# Patient Record
Sex: FEMALE | Race: BLACK OR AFRICAN AMERICAN | Employment: OTHER | URBAN - METROPOLITAN AREA
[De-identification: names, ages, dates, MRNs, and addresses within clinical notes are randomized per-mention and may not be internally consistent; named-entity substitution may affect disease eponyms.]

---

## 2019-05-04 ENCOUNTER — HOSPITAL ENCOUNTER (OUTPATIENT)
Dept: MRI IMAGING | Age: 74
Discharge: HOME | End: 2019-05-04
Attending: NURSE PRACTITIONER
Payer: MEDICARE

## 2019-05-04 VITALS — WEIGHT: 181 LBS | BODY MASS INDEX: 30.12 KG/M2

## 2019-05-04 DIAGNOSIS — K85.90 ACUTE PANCREATITIS, UNSPECIFIED COMPLICATION STATUS, UNSPECIFIED PANCREATITIS TYPE: ICD-10-CM

## 2019-05-04 PROCEDURE — A9585 GADOBUTROL INJECTION: HCPCS | Performed by: INTERNAL MEDICINE

## 2019-05-04 PROCEDURE — 77030021566

## 2019-05-04 PROCEDURE — 74011250636 HC RX REV CODE- 250/636: Performed by: INTERNAL MEDICINE

## 2019-05-04 PROCEDURE — 74183 MRI ABD W/O CNTR FLWD CNTR: CPT

## 2019-05-04 RX ADMIN — GADOBUTROL 8 ML: 604.72 INJECTION INTRAVENOUS at 09:17

## 2019-06-04 RX ORDER — OMEPRAZOLE 20 MG/1
20 TABLET, DELAYED RELEASE ORAL DAILY
Status: ON HOLD | COMMUNITY
End: 2019-06-11

## 2019-06-04 NOTE — PERIOP NOTES
[address] [place] [address] Dr Nicholas Preprocedure Instructions      1. On the day of your surgery, please report to registration located on the 2nd floor of the  [facility]. yes    2. You must have a responsible adult to drive you to the hospital, stay at the hospital during your procedure and drive you home. If they leave your procedure will not be started (no exceptions). yes    3. Do not have anything to eat or drink (including water, gum, mints, coffee, and juice) after midnight. This does not apply to the medications you were instructed to take by your physician. yesIf you are currently taking Plavix, Coumadin, Aspirin, or other blood-thinning agents, contact your physician for special instructions. yes,    4. If you are having a procedure that requires bowel prep: We recommend that you have only clear liquids the day before your procedure and begin your bowel prep by 5:00 pm.  You may continue to drink clear liquids until midnight. If for any reason you are not able to complete your prep please notify your physician immediately. yes    5. Have a list of all current medications, including vitamins, herbal supplements and any other over the counter medications. yes    6. If you wear glasses, contacts, dentures and/or hearing aids, they may be removed prior to procedure, please bring a case to store them in. yes    7. You should understand that if you do not follow these instructions your procedure may be cancelled. If your physical condition changes (I.e. fever, cold or flu) please contact your doctor as soon as possible. 8. It is important that you be on time.   If for any reason you are unable to keep your appointment please call )- the day of or your physicians office prior to your scheduled procedure

## 2019-06-11 ENCOUNTER — HOSPITAL ENCOUNTER (OUTPATIENT)
Age: 74
Setting detail: OUTPATIENT SURGERY
Discharge: HOME | End: 2019-06-11
Attending: SPECIALIST | Admitting: SPECIALIST
Payer: MEDICARE

## 2019-06-11 ENCOUNTER — ANESTHESIA EVENT (OUTPATIENT)
Dept: ENDOSCOPY | Age: 74
End: 2019-06-11
Payer: MEDICARE

## 2019-06-11 ENCOUNTER — ANESTHESIA (OUTPATIENT)
Dept: ENDOSCOPY | Age: 74
End: 2019-06-11
Payer: MEDICARE

## 2019-06-11 VITALS
TEMPERATURE: 97.5 F | OXYGEN SATURATION: 99 % | WEIGHT: 188.49 LBS | RESPIRATION RATE: 17 BRPM | DIASTOLIC BLOOD PRESSURE: 83 MMHG | SYSTOLIC BLOOD PRESSURE: 166 MMHG | HEIGHT: 65 IN | BODY MASS INDEX: 31.4 KG/M2 | HEART RATE: 65 BPM

## 2019-06-11 LAB
GLUCOSE BLD STRIP.AUTO-MCNC: 112 MG/DL (ref 65–100)
SERVICE CMNT-IMP: ABNORMAL

## 2019-06-11 PROCEDURE — 82962 GLUCOSE BLOOD TEST: CPT

## 2019-06-11 PROCEDURE — 76060000031 HC ANESTHESIA FIRST 0.5 HR: Performed by: SPECIALIST

## 2019-06-11 PROCEDURE — 74011250637 HC RX REV CODE- 250/637: Performed by: ANESTHESIOLOGY

## 2019-06-11 PROCEDURE — 74011250636 HC RX REV CODE- 250/636

## 2019-06-11 PROCEDURE — 76040000019: Performed by: SPECIALIST

## 2019-06-11 RX ORDER — SPIRONOLACTONE 25 MG/1
25 TABLET ORAL 2 TIMES DAILY
COMMUNITY

## 2019-06-11 RX ORDER — SODIUM CHLORIDE 9 MG/ML
50 INJECTION, SOLUTION INTRAVENOUS CONTINUOUS
Status: DISCONTINUED | OUTPATIENT
Start: 2019-06-11 | End: 2019-06-11 | Stop reason: HOSPADM

## 2019-06-11 RX ORDER — EPINEPHRINE 0.1 MG/ML
1 INJECTION INTRACARDIAC; INTRAVENOUS
Status: DISCONTINUED | OUTPATIENT
Start: 2019-06-11 | End: 2019-06-11 | Stop reason: HOSPADM

## 2019-06-11 RX ORDER — CARVEDILOL 12.5 MG/1
25 TABLET ORAL ONCE
Status: COMPLETED | OUTPATIENT
Start: 2019-06-11 | End: 2019-06-11

## 2019-06-11 RX ORDER — SODIUM CHLORIDE 9 MG/ML
INJECTION, SOLUTION INTRAVENOUS
Status: DISCONTINUED | OUTPATIENT
Start: 2019-06-11 | End: 2019-06-11 | Stop reason: HOSPADM

## 2019-06-11 RX ORDER — PROPOFOL 10 MG/ML
INJECTION, EMULSION INTRAVENOUS
Status: DISCONTINUED | OUTPATIENT
Start: 2019-06-11 | End: 2019-06-11 | Stop reason: HOSPADM

## 2019-06-11 RX ORDER — FLUMAZENIL 0.1 MG/ML
0.2 INJECTION INTRAVENOUS
Status: DISCONTINUED | OUTPATIENT
Start: 2019-06-11 | End: 2019-06-11 | Stop reason: HOSPADM

## 2019-06-11 RX ORDER — PROPOFOL 10 MG/ML
INJECTION, EMULSION INTRAVENOUS AS NEEDED
Status: DISCONTINUED | OUTPATIENT
Start: 2019-06-11 | End: 2019-06-11 | Stop reason: HOSPADM

## 2019-06-11 RX ORDER — NALOXONE HYDROCHLORIDE 0.4 MG/ML
0.4 INJECTION, SOLUTION INTRAMUSCULAR; INTRAVENOUS; SUBCUTANEOUS
Status: DISCONTINUED | OUTPATIENT
Start: 2019-06-11 | End: 2019-06-11 | Stop reason: HOSPADM

## 2019-06-11 RX ORDER — FENTANYL CITRATE 50 UG/ML
25 INJECTION, SOLUTION INTRAMUSCULAR; INTRAVENOUS AS NEEDED
Status: DISCONTINUED | OUTPATIENT
Start: 2019-06-11 | End: 2019-06-11 | Stop reason: HOSPADM

## 2019-06-11 RX ORDER — DEXTROMETHORPHAN/PSEUDOEPHED 2.5-7.5/.8
1.2 DROPS ORAL
Status: DISCONTINUED | OUTPATIENT
Start: 2019-06-11 | End: 2019-06-11 | Stop reason: HOSPADM

## 2019-06-11 RX ORDER — ATROPINE SULFATE 0.1 MG/ML
0.5 INJECTION INTRAVENOUS
Status: DISCONTINUED | OUTPATIENT
Start: 2019-06-11 | End: 2019-06-11 | Stop reason: HOSPADM

## 2019-06-11 RX ORDER — MIDAZOLAM HYDROCHLORIDE 1 MG/ML
.25-5 INJECTION, SOLUTION INTRAMUSCULAR; INTRAVENOUS AS NEEDED
Status: DISCONTINUED | OUTPATIENT
Start: 2019-06-11 | End: 2019-06-11 | Stop reason: HOSPADM

## 2019-06-11 RX ADMIN — CARVEDILOL 25 MG: 12.5 TABLET, FILM COATED ORAL at 07:09

## 2019-06-11 RX ADMIN — PROPOFOL 50 MG: 10 INJECTION, EMULSION INTRAVENOUS at 07:28

## 2019-06-11 RX ADMIN — PROPOFOL 125 MCG/KG/MIN: 10 INJECTION, EMULSION INTRAVENOUS at 07:28

## 2019-06-11 RX ADMIN — SODIUM CHLORIDE: 9 INJECTION, SOLUTION INTRAVENOUS at 07:22

## 2019-06-11 NOTE — PROGRESS NOTES
Chula Rubin  1945  [id number]    Situation:  Verbal report received from: Kimo Martinez, RN   Procedure: Procedure(s):  COLONOSCOPY    Background:    Preoperative diagnosis: AMILY HX OF MALIGNANT NEOPLASM  Postoperative diagnosis: diverticulosis   hemorrhoids    :  Dr. Dusty Hopkins   Assistant(s): Endoscopy Technician-1: Mel Evans  Endoscopy RN-1: Kaylynn Wong RN    Specimens: * No specimens in log *  H. Pylori  no    Assessment:  Intra-procedure medications      Anesthesia gave intra-procedure sedation and medications, see anesthesia flow sheet yes    Intravenous fluids: NS@ KVO     Vital signs stable   yes    Abdominal assessment: round and soft   yes    Recommendation:  Discharge patient per MD order  yes.   Return to floor  outpatient  Family or Friend   Daughter   Permission to share finding with family or friend yes

## 2019-06-11 NOTE — DISCHARGE INSTRUCTIONS
[address] [facility] MULU [place] MD  [phone]      June 11, 2019    Abhishek Todd  YOB: 1945    COLONOSCOPY DISCHARGE INSTRUCTIONS    If there is redness at IV site you should apply warm compress to area. If redness or soreness persist contact Dr. Alla Ellsworth' or your primary care doctor. There may be a slight amount of blood passed from the rectum. Gaseous discomfort may develop, but walking, belching will help relieve this. You may not operate a vehicle for 12 hours  You may not operate machinery or dangerous appliances for rest of today  You may not drink alcoholic beverages for 12 hours  Avoid making any critical decisions for 24 hours    DIET:  You may resume your normal diet, but some patients find that heavy or large meals may lead to indigestion or vomiting. I suggest a light meal as first food intake. MEDICATIONS:  The use of some over-the-counter pain medication may lead to bleeding after colon biopsies or polyp removal.  Tylenol (also called acetaminophen) is safe to take even if you have had colonoscopy with polyp removal.  Based on the procedure you had today you may safely take aspirin or aspirin-like products for the next ten (10) days. Remember that Tylenol (also called acetaminophen) is safe to take after colonoscopy even if you have had biopsies or polyps removed. ACTIVITY:  You may resume your normal household activities, but it is recommended that you spend the remainder of the day resting -  avoid any strenuous activity. CALL DR. Marc Hodgkins' OFFICE IF:  Increasing pain, nausea, vomiting  Abdominal distension (swelling)  Significant new or increased bleeding (oral or rectal)  Fever/Chills  Chest pain/shortness of breath                       Additional instructions:   Great news, no polyps or other serious findings  Next colonoscopy 5 years.      It was an honor to be your doctor today.  Please let me or my office staff know if you have any feedback about today's procedure. Nubia Win MD    Colonoscopy saves lives, and can prevent colon cancer. Everyone aged 48 or older needs colonoscopy.   Tell your family and friends: get the test!

## 2019-06-11 NOTE — PROCEDURES
[address] [facility] MULU [place] MD  [phone]      2019    Colonoscopy Procedure Note  Jeremy Manzo  :  1945  Inez Medical Record Number: [id number]    Indications:     Family history of coloretal cancer, sister / son.  , Screening colonoscopy  PCP:  Veronica Johnson DO  Anesthesia/Sedation: Conscious Sedation/Moderate Sedation/GETA, see notes  Endoscopist:  Dr. Buzz Del Angel  Complications:  None  Estimated Blood Loss:  None    Permit:  The indications, risks, benefits and alternatives were reviewed with the patient or their decision maker who was provided an opportunity to ask questions and all questions were answered. The specific risks of colonoscopy with conscious sedation were reviewed, including but not limited to anesthetic complication, bleeding, adverse drug reaction, missed lesion, infection, IV site reactions, and intestinal perforation which would lead to the need for surgical repair. Alternatives to colonoscopy including radiographic imaging, observation without testing, or laboratory testing were reviewed including the limitations of those alternatives. After considering the options and having all their questions answered, the patient or their decision maker provided both verbal and written consent to proceed. Procedure in Detail:  After obtaining informed consent, positioning of the patient in the left lateral decubitus position, and conduction of a pre-procedure pause or \"time out\" the endoscope was introduced into the anus and advanced to the cecum, which was identified by the ileocecal valve and appendiceal orifice. The quality of the colonic preparation was adequate. A careful inspection was made as the colonoscope was withdrawn, findings and interventions are described below. Findings:    There is diverticulosis in the sigmoid colon without complications such as bleeding, inflammatory change, or luminal narrowing. In the rectum, medium internal hemorrhoids are noted without bleeding. Specimens:    None    Complications:   None; patient tolerated the procedure well. Impression:  Diverticulosis/hemorrhoids. Recommendations:     - Repeat colonoscopy in 5 years. Thank you for entrusting me with this patient's care. Please do not hesitate to contact me with any questions or if I can be of assistance with any of your other patients' GI needs. Signed By: Jayden Ramos MD                        June 11, 2019      Surgical assistant none. Implants none unless specified.

## 2019-06-11 NOTE — H&P
68 y.o. female for open access colonoscopy for screening   Additional data for completion of the targeted pre-endoscopy H&P will be provided under 'H&P interval notes'. Please see that document which will be attached to this.   Lacy Rehman MD

## 2019-06-11 NOTE — ANESTHESIA PREPROCEDURE EVALUATION
Relevant Problems   No relevant active problems       Anesthetic History   No history of anesthetic complications            Review of Systems / Medical History  Patient summary reviewed, nursing notes reviewed and pertinent labs reviewed    Pulmonary  Within defined limits                 Neuro/Psych   Within defined limits           Cardiovascular    Hypertension        Dysrhythmias (2013 ablation)   Hyperlipidemia      Comments: Coreg BID since ablation, will give now   GI/Hepatic/Renal  Within defined limits              Endo/Other  Within defined limits  Diabetes  Hypothyroidism  Arthritis     Other Findings              Physical Exam    Airway  Mallampati: III  TM Distance: 4 - 6 cm  Neck ROM: normal range of motion   Mouth opening: Normal     Cardiovascular    Rhythm: regular  Rate: normal         Dental    Dentition: Caps/crowns and Bridges     Pulmonary  Breath sounds clear to auscultation               Abdominal  GI exam deferred       Other Findings            Anesthetic Plan    ASA: 3  Anesthesia type: MAC            Anesthetic plan and risks discussed with: Patient

## 2019-06-11 NOTE — ANESTHESIA POSTPROCEDURE EVALUATION
Procedure(s):  COLONOSCOPY. MAC    Anesthesia Post Evaluation        Patient location during evaluation: PACU  Patient participation: complete - patient participated  Level of consciousness: awake and alert  Pain management: adequate  Airway patency: patent  Anesthetic complications: no  Cardiovascular status: acceptable  Respiratory status: acceptable  Hydration status: acceptable        Vitals Value Taken Time   /74 6/11/2019  7:56 AM   Temp     Pulse 65 6/11/2019  7:57 AM   Resp 19 6/11/2019  7:57 AM   SpO2 98 % 6/11/2019  7:57 AM   Vitals shown include unvalidated device data.

## 2019-06-11 NOTE — INTERVAL H&P NOTE
Pre-Endoscopy H&P Update Chief complaint/HPI/ROS:  The indication for the procedure, the patient's history and the patient's current medications are reviewed prior to the procedure and that data is reported on the H&P to which this document is attached. Any significant complaints with regard to organ systems will be noted, and if not mentioned then a review of systems is not contributory. Past Medical History:  
Diagnosis Date  Arthritis  Diabetes ([facility] [address]  Goiter 5 total 1 resting on \"windpipe\".  HX OTHER MEDICAL   
 strep/ tonsillitis  Hypercholesterolemia  Hypertension  Pancreatitis   
 SVT (supraventricular tachycardia) ([facility] [address]   
 ablation in  Past Surgical History:  
Procedure Laterality Date  CARDIAC SURG PROCEDURE UNLIST    
 ablation  HX HYSTERECTOMY  HX TUBAL LIGATION Social  
Social History Tobacco Use  Smoking status: Former Smoker Packs/day: 1.00 Years: 17.00 Pack years: 17.00 Last attempt to quit: 1979 Years since quittin.0  Smokeless tobacco: Never Used Substance Use Topics  Alcohol use: No  
  
Family History Problem Relation Age of Onset  Cancer Father 61  
     multi myloma  Cancer Brother 52  
     colon/liver  Cancer Daughter 29  
     cancerous colon polyps Allergies Allergen Reactions  Hydrochlorothiazide Other (comments) Hydrochlorothiazide/Triamterene - acute pancreatitis  Morphine Rash  Percocet [Oxycodone-Acetaminophen] Rash  Vicodin [Hydrocodone-Acetaminophen] Swelling Prior to Admission Medications Prescriptions Last Dose Informant Patient Reported? Taking? Ramipril 10 mg Tab 6/10/2019 at am  Yes Yes Sig: Take 10 mg by mouth. amLODIPine (NORVASC) 5 mg tablet 2019 at 0500  Yes Yes Sig: Take 5 mg by mouth daily. Indications: high blood pressure  
aspirin delayed-release 81 mg tablet 6/10/2019 at am  No Yes Sig: Take 1 Tab by mouth daily. carvedilol (COREG) 25 mg tablet 6/10/2019 at 1700  Yes Yes Sig: Take 10 mg by mouth daily. cholecalciferol, vitamin d3, (VITAMIN D) 1,000 unit tablet 6/10/2019 at Unknown time  Yes Yes Sig: Take 1,000 Units by mouth daily. glimepiride (AMARYL) 2 mg tablet 6/10/2019 at am  Yes Yes Sig: Take 2 mg by mouth two (2) times a day (before meals). glucose blood VI test strips (BLOOD GLUCOSE TEST) strip 2019 at Unknown time  No Yes Sig: by Does Not Apply route. 1 box  
insulin glargine (LANTUS SOLOSTAR U-100 INSULIN) 100 unit/mL (3 mL) inpn 6/10/2019 at pm  Yes Yes Si Units by SubCUTAneous route nightly. Indications: type 2 diabetes mellitus  
insulin lispro (HUMALOG) 100 unit/mL kwikpen 6/10/2019 at 1400  Yes Yes Sig: by SubCUTAneous route Before breakfast, lunch, dinner and at bedtime. Indications: type 2 diabetes mellitus  
magnesium oxide (MAG-OX) 400 mg tablet 6/10/2019 at am  No Yes Sig: Take 2 Tabs by mouth daily. metformin (GLUCOPHAGE) 1,000 mg tablet 6/10/2019 at am  No Yes Sig: Take 1 Tab by mouth two (2) times daily (with meals). pravastatin (PRAVACHOL) 20 mg tablet 6/10/2019 at am  Yes Yes Sig: Take 20 mg by mouth nightly. Indications: high cholesterol  
spironolactone (ALDACTONE) 25 mg tablet 2019 at 0500  Yes Yes Sig: Take  by mouth daily. vitamin E (AQUA GEMS) 400 unit capsule 6/10/2019 at am  Yes Yes Sig: Take 400 Units by mouth daily. Facility-Administered Medications: None PHYSICAL EXAM:  The patient is examined immediately prior to the procedure. Visit Vitals /75 Pulse 63 Temp 98 °F (36.7 °C) Resp 14 Ht 5' 5\" (1.651 m) Wt 85.5 kg (188 lb 7.9 oz) Breastfeeding? No  
BMI 31.37 kg/m² Gen: Appears comfortable, no distress. Pulm: comfortable respirations with no abnormal audible breath sounds HEART: well perfused, no abnormal audible heart sounds GI: abdomen flat. PLAN:  Informed consent discussion held, patient afforded an opportunity to ask questions and all questions answered. After being advised of the risks, benefits, and alternatives, the patient requested that we proceed and indicated so on a written consent form. Will proceed with procedure as planned.  
Beto Manzanares MD

## 2019-06-11 NOTE — PERIOP NOTES
Received Report From Tyree Perez CRNA @ [phone], see anesthesia notes. Care of the patient transferred to procedure nurse Сергей Camacho RN @ 8768    Out of Procedure and sent to post-recovery @ 3084    Post-recovery report given to CHUY HYDE RN  @ 9876    Patient ABD remains soft and non-tender post procedure. Pt has no complaints at this time and tolerated the procedure well. Endoscope was pre-cleaned at bedside immediately following procedure by [address] [place] [address].

## 2019-11-13 ENCOUNTER — HOSPITAL ENCOUNTER (OUTPATIENT)
Dept: MAMMOGRAPHY | Age: 74
Discharge: HOME | End: 2019-11-13
Attending: FAMILY MEDICINE
Payer: MEDICARE

## 2019-11-13 DIAGNOSIS — Z12.31 VISIT FOR SCREENING MAMMOGRAM: ICD-10-CM

## 2019-11-13 PROCEDURE — 77067 SCR MAMMO BI INCL CAD: CPT

## 2021-10-30 LAB
CREATININE, EXTERNAL: 0.69
HBA1C MFR BLD HPLC: 7.3 %
LDL CHOLESTEROL, EXTERNAL: 53

## 2022-03-03 ENCOUNTER — OFFICE VISIT (OUTPATIENT)
Dept: CARDIOLOGY CLINIC | Age: 77
End: 2022-03-03
Payer: MEDICARE

## 2022-03-03 VITALS
HEIGHT: 65 IN | BODY MASS INDEX: 31.16 KG/M2 | WEIGHT: 187 LBS | OXYGEN SATURATION: 97 % | HEART RATE: 68 BPM | DIASTOLIC BLOOD PRESSURE: 78 MMHG | SYSTOLIC BLOOD PRESSURE: 138 MMHG

## 2022-03-03 DIAGNOSIS — R53.83 FATIGUE, UNSPECIFIED TYPE: Primary | ICD-10-CM

## 2022-03-03 DIAGNOSIS — I10 HYPERTENSION, UNSPECIFIED TYPE: ICD-10-CM

## 2022-03-03 DIAGNOSIS — Z01.818 PREOP TESTING: ICD-10-CM

## 2022-03-03 PROCEDURE — 3017F COLORECTAL CA SCREEN DOC REV: CPT | Performed by: INTERNAL MEDICINE

## 2022-03-03 PROCEDURE — G8536 NO DOC ELDER MAL SCRN: HCPCS | Performed by: INTERNAL MEDICINE

## 2022-03-03 PROCEDURE — G8427 DOCREV CUR MEDS BY ELIG CLIN: HCPCS | Performed by: INTERNAL MEDICINE

## 2022-03-03 PROCEDURE — G8400 PT W/DXA NO RESULTS DOC: HCPCS | Performed by: INTERNAL MEDICINE

## 2022-03-03 PROCEDURE — G8754 DIAS BP LESS 90: HCPCS | Performed by: INTERNAL MEDICINE

## 2022-03-03 PROCEDURE — 1101F PT FALLS ASSESS-DOCD LE1/YR: CPT | Performed by: INTERNAL MEDICINE

## 2022-03-03 PROCEDURE — G8510 SCR DEP NEG, NO PLAN REQD: HCPCS | Performed by: INTERNAL MEDICINE

## 2022-03-03 PROCEDURE — 1090F PRES/ABSN URINE INCON ASSESS: CPT | Performed by: INTERNAL MEDICINE

## 2022-03-03 PROCEDURE — 99204 OFFICE O/P NEW MOD 45 MIN: CPT | Performed by: INTERNAL MEDICINE

## 2022-03-03 PROCEDURE — G8752 SYS BP LESS 140: HCPCS | Performed by: INTERNAL MEDICINE

## 2022-03-03 PROCEDURE — 93000 ELECTROCARDIOGRAM COMPLETE: CPT | Performed by: INTERNAL MEDICINE

## 2022-03-03 PROCEDURE — G8417 CALC BMI ABV UP PARAM F/U: HCPCS | Performed by: INTERNAL MEDICINE

## 2022-03-03 RX ORDER — METFORMIN HYDROCHLORIDE 1000 MG/1
1000 TABLET ORAL 2 TIMES DAILY WITH MEALS
Qty: 1 TABLET | Refills: 0 | Status: CANCELLED | OUTPATIENT
Start: 2022-03-03

## 2022-03-03 RX ORDER — BLOOD-GLUCOSE TRANSMITTER
EACH MISCELLANEOUS
COMMUNITY

## 2022-03-03 NOTE — PROGRESS NOTES
Lilliam Benitez is a 77 y.o. female    Chief Complaint   Patient presents with    New Patient     est care     Patient changing from Dr Chang Gentle    Chest pain No    SOB No    Dizziness No     Swelling No    Refills No    Visit Vitals  /78 (BP 1 Location: Left upper arm, BP Patient Position: Sitting)   Pulse 68   Ht 5' 5\" (1.651 m)   Wt 187 lb (84.8 kg)   SpO2 97%   BMI 31.12 kg/m²       1. Have you been to the ER, urgent care clinic since your last visit? Hospitalized since your last visit? No    2. Have you seen or consulted any other health care providers outside of the [address] [place] [address] since your last visit? Include any pap smears or colon screening.   No

## 2022-03-03 NOTE — LETTER
3/3/2022    Patient: Nisha Alvarez   YOB: 1945   Date of Visit: 3/3/2022     Nancy Sharma DO  [address] [place] [id number]  Via Fax: [phone]    Dear Nancy Sharma DO,      Thank you for referring Ms. Olita Buerger to [facility] for evaluation. My notes for this consultation are attached. If you have questions, please do not hesitate to call me. I look forward to following your patient along with you.       Sincerely,    Titi Neumann MD

## 2022-03-15 ENCOUNTER — TELEPHONE (OUTPATIENT)
Dept: CARDIOLOGY CLINIC | Age: 77
End: 2022-03-15

## 2022-03-15 NOTE — TELEPHONE ENCOUNTER
Patient is scheduled to have an echocardiogram and nuclear stress test on 3/22/2022. Echo has been approved. Peer to peer needed for nuclear stress test.  Please call [facility] at [phone] with order #[id number]. Please cancel appointment if peer to peer is not going to be done and let me know it's been cancelled. Please let me know if peer to peer is done and approval is received.      Thank you,     Marie Barraza

## 2022-03-17 ENCOUNTER — TELEPHONE (OUTPATIENT)
Dept: CARDIOLOGY CLINIC | Age: 77
End: 2022-03-17

## 2022-03-17 DIAGNOSIS — R53.83 FATIGUE, UNSPECIFIED TYPE: ICD-10-CM

## 2022-03-17 DIAGNOSIS — E78.5 HYPERLIPIDEMIA, UNSPECIFIED HYPERLIPIDEMIA TYPE: ICD-10-CM

## 2022-03-17 DIAGNOSIS — I10 HYPERTENSION, UNSPECIFIED TYPE: ICD-10-CM

## 2022-03-17 DIAGNOSIS — R94.31 ABNORMAL ELECTROCARDIOGRAM (ECG) (EKG): ICD-10-CM

## 2022-03-17 DIAGNOSIS — I47.1 PAROXYSMAL SUPRAVENTRICULAR TACHYCARDIA (HCC): Primary | ICD-10-CM

## 2022-03-17 RX ORDER — ATENOLOL 25 MG/1
TABLET ORAL
Qty: 2 TABLET | Refills: 0 | Status: SHIPPED | OUTPATIENT
Start: 2022-03-17 | End: 2022-06-01

## 2022-03-17 NOTE — TELEPHONE ENCOUNTER
Prescription for 25mg atenolol sent to pharmacy. 1 tablet to be taken night prior to testing. Take 2nd tablet approximately 1 hour prior to Coronary CTA testing.

## 2022-03-17 NOTE — TELEPHONE ENCOUNTER
Informed patient insurance denied coverage of nuclear stress test.  Ordered Coronary CTA per Dr. Masood Wynn.

## 2022-03-22 ENCOUNTER — ANCILLARY PROCEDURE (OUTPATIENT)
Dept: CARDIOLOGY CLINIC | Age: 77
End: 2022-03-22

## 2022-03-22 VITALS
HEIGHT: 65 IN | SYSTOLIC BLOOD PRESSURE: 138 MMHG | DIASTOLIC BLOOD PRESSURE: 70 MMHG | BODY MASS INDEX: 31.16 KG/M2 | WEIGHT: 187 LBS

## 2022-03-22 DIAGNOSIS — I10 HYPERTENSION, UNSPECIFIED TYPE: ICD-10-CM

## 2022-03-22 DIAGNOSIS — Z01.818 PREOP TESTING: ICD-10-CM

## 2022-03-22 PROCEDURE — 93306 TTE W/DOPPLER COMPLETE: CPT | Performed by: INTERNAL MEDICINE

## 2022-03-25 LAB
ECHO AO ASC DIAM: 3 CM
ECHO AO ASCENDING AORTA INDEX: 1.56 CM/M2
ECHO AO ROOT DIAM: 3.1 CM
ECHO AO ROOT INDEX: 1.61 CM/M2
ECHO AV AREA PEAK VELOCITY: 3.1 CM2
ECHO AV AREA VTI: 3.4 CM2
ECHO AV AREA/BSA PEAK VELOCITY: 1.6 CM2/M2
ECHO AV AREA/BSA VTI: 1.8 CM2/M2
ECHO AV MEAN GRADIENT: 5 MMHG
ECHO AV MEAN VELOCITY: 1 M/S
ECHO AV PEAK GRADIENT: 10 MMHG
ECHO AV PEAK VELOCITY: 1.6 M/S
ECHO AV VELOCITY RATIO: 0.81
ECHO AV VTI: 30.9 CM
ECHO EST RA PRESSURE: 3 MMHG
ECHO LA DIAMETER INDEX: 2.19 CM/M2
ECHO LA DIAMETER: 4.2 CM
ECHO LA TO AORTIC ROOT RATIO: 1.35
ECHO LA VOL 2C: 61 ML (ref 22–52)
ECHO LA VOL 4C: 77 ML (ref 22–52)
ECHO LA VOLUME AREA LENGTH: 74 ML
ECHO LA VOLUME INDEX A2C: 32 ML/M2 (ref 16–34)
ECHO LA VOLUME INDEX A4C: 40 ML/M2 (ref 16–34)
ECHO LA VOLUME INDEX AREA LENGTH: 39 ML/M2 (ref 16–34)
ECHO LV E' LATERAL VELOCITY: 6 CM/S
ECHO LV E' SEPTAL VELOCITY: 5 CM/S
ECHO LV EDV A2C: 103 ML
ECHO LV EDV A4C: 114 ML
ECHO LV EDV BP: 108 ML (ref 56–104)
ECHO LV EDV INDEX A4C: 59 ML/M2
ECHO LV EDV INDEX BP: 56 ML/M2
ECHO LV EDV NDEX A2C: 54 ML/M2
ECHO LV EJECTION FRACTION A2C: 62 %
ECHO LV EJECTION FRACTION A4C: 75 %
ECHO LV EJECTION FRACTION BIPLANE: 69 % (ref 55–100)
ECHO LV ESV A2C: 39 ML
ECHO LV ESV A4C: 28 ML
ECHO LV ESV BP: 33 ML (ref 19–49)
ECHO LV ESV INDEX A2C: 20 ML/M2
ECHO LV ESV INDEX A4C: 15 ML/M2
ECHO LV ESV INDEX BP: 17 ML/M2
ECHO LV FRACTIONAL SHORTENING: 38 % (ref 28–44)
ECHO LV INTERNAL DIMENSION DIASTOLE INDEX: 2.45 CM/M2
ECHO LV INTERNAL DIMENSION DIASTOLIC: 4.7 CM (ref 3.9–5.3)
ECHO LV INTERNAL DIMENSION SYSTOLIC INDEX: 1.51 CM/M2
ECHO LV INTERNAL DIMENSION SYSTOLIC: 2.9 CM
ECHO LV IVSD: 0.9 CM (ref 0.6–0.9)
ECHO LV MASS 2D: 142.7 G (ref 67–162)
ECHO LV MASS INDEX 2D: 74.3 G/M2 (ref 43–95)
ECHO LV POSTERIOR WALL DIASTOLIC: 0.9 CM (ref 0.6–0.9)
ECHO LV RELATIVE WALL THICKNESS RATIO: 0.38
ECHO LVOT AREA: 3.8 CM2
ECHO LVOT AV VTI INDEX: 0.9
ECHO LVOT DIAM: 2.2 CM
ECHO LVOT MEAN GRADIENT: 4 MMHG
ECHO LVOT PEAK GRADIENT: 7 MMHG
ECHO LVOT PEAK VELOCITY: 1.3 M/S
ECHO LVOT STROKE VOLUME INDEX: 54.8 ML/M2
ECHO LVOT SV: 105.2 ML
ECHO LVOT VTI: 27.7 CM
ECHO MV A VELOCITY: 0.91 M/S
ECHO MV AREA PHT: 3.6 CM2
ECHO MV AREA VTI: 4.7 CM2
ECHO MV E DECELERATION TIME (DT): 211.8 MS
ECHO MV E VELOCITY: 0.79 M/S
ECHO MV E/A RATIO: 0.87
ECHO MV E/E' LATERAL: 13.17
ECHO MV E/E' RATIO (AVERAGED): 14.48
ECHO MV E/E' SEPTAL: 15.8
ECHO MV LVOT VTI INDEX: 0.82
ECHO MV MAX VELOCITY: 0.9 M/S
ECHO MV MEAN GRADIENT: 1 MMHG
ECHO MV MEAN VELOCITY: 0.5 M/S
ECHO MV PEAK GRADIENT: 3 MMHG
ECHO MV PRESSURE HALF TIME (PHT): 61.4 MS
ECHO MV VTI: 22.6 CM
ECHO RIGHT VENTRICULAR SYSTOLIC PRESSURE (RVSP): 33 MMHG
ECHO RV FREE WALL PEAK S': 11 CM/S
ECHO RV INTERNAL DIMENSION: 4.1 CM
ECHO RV TAPSE: 2.2 CM (ref 1.5–2)
ECHO TV REGURGITANT MAX VELOCITY: 2.75 M/S
ECHO TV REGURGITANT PEAK GRADIENT: 30 MMHG

## 2022-04-04 ENCOUNTER — TELEPHONE (OUTPATIENT)
Dept: CARDIOLOGY CLINIC | Age: 77
End: 2022-04-04

## 2022-04-04 NOTE — TELEPHONE ENCOUNTER
----- Message from Florentin Franklin MD sent at 4/1/2022 10:09 PM EDT -----  ECHO - nml pump function/ Minor structural/valvular changes - Will discuss on f/up visit

## 2022-04-04 NOTE — TELEPHONE ENCOUNTER
Called patient to notify her of testing results per Dr. Chandrika Nicole:    ECHO - nml pump function/ Minor structural/valvular changes - Will discuss on f/up visit. Left VM to return call to office.

## 2022-05-09 NOTE — PROGRESS NOTES
Jenelle Mariee MD., [facility]    [address] [place], [address] [facility]    Office [phone],LUB [phone]           Keely Xiong is a 77 y.o. female referred for evaluation of hx of SVT . Consult requested by Silva Pichardo DO    CC - as documented in EMR    Dear Dr. Silva Pichardo DO    I had the pleasure of seeing Ms. Keely Xiong in the office today. Assessment:     Fatigue  History of SVT-s/p ablation 2013  Hypertension  Hyperlipidemia  Diabetes mellitus-not controlled  Goiter - needs surgery ;       Plan:      Cor CTA scheduled for 5/25/2022  Echo-3/2022-EF normal  Blood pressure controlled. Lipids controlled per patient. On Crestor  Follow-up 6 months/earlier as needed. Aggressive cardiovascular risk for modification. Patient understands the plan. All questions were answered to the patient's satisfaction. I appreciate the opportunity to be involved in Ms. Sun. See note below for details. Please do not hesitate to contact us   with questions or concerns. Jenelle Mariee MD      Cardiac Testing/ Procedures: A. Cardiac Cath/PCI:    B.ECHO/MICHELA: 3/2022      Left Ventricle: Left ventricle size is normal. Normal wall thickness (upper normal). Sigmoid septum. Normal wall motion. Normal left ventricular systolic function. EF by 2D Simpsons Biplane is 69%. Diastolic function present with increased LAP with normal LV EF.   Left Atrium: Left atrial volume index is mildly increased (35-41 mL/m2).   Mitral Valve: Mild transvalvular regurgitation.   Tricuspid Valve: Mild transvalvular regurgitation. RVSP is 33 mmHg.   Pulmonic Valve: Mild transvalvular regurgitation      C. StressNuclear/Stress ECHO/Stress test:    D.Vascular:    E. EP: 2013 - SVT ablation    F. Miscellaneous:    History:     Keely Xiong is a 77 y.o. female who is referred establish cardiac care/prior to thyroid surgery    Patient used to be followed by  Joellen Solano. She has had SVT ablation . Remote history of cardiac work-up including stress test being negative per patient. Complains of fatigue. No chest pain/dyspnea. No swelling lower extremities. No palpitations. Home SBP in 120s    Last HbA1c - 72 - f    80-90s SVT; Was on medications; continued to have breakthrough episodes - Had ablation  - since then no breakthrough episodes    Past Medical/Surgical and Family/Social History:     Past Medical History:   Diagnosis Date    Arthritis     Diabetes ([address]     Goiter     5 total 1 resting on \"windpipe\".  HX OTHER MEDICAL     strep/ tonsillitis    Hypercholesterolemia     Hypertension     Pancreatitis     SVT (supraventricular tachycardia) ([address]     ablation in       Past Surgical History:   Procedure Laterality Date    COLONOSCOPY N/A 2019    COL[facility]i Donovan [facility] at [facility] HX HYSTERECTOMY      HX TUBAL LIGATION      MI CARDIAC SURG PROCEDURE UNLIST      ablation     Family History   Problem Relation Age of Onset    Cancer Father 61        multi myloma    Cancer Brother 52        colon/liver    Cancer Daughter 29        cancerous colon polyps      Social History     Tobacco Use    Smoking status: Former Smoker     Packs/day: 1.00     Years: 17.00     Pack years: 17.00     Quit date: 1979     Years since quittin.9    Smokeless tobacco: Never Used   Substance Use Topics    Alcohol use: No    Drug use: No            Medications:       Current Outpatient Medications   Medication Sig Dispense    insulin glargine,hum.rec.anlog (TOUJEO MAX U-300 SOLOSTAR SC) 45 Units by SubCUTAneous route.  VITAMIN B COMPLEX PO Take 1 Tablet by mouth daily. 400 iu daily     Blood-Glucose Transmitter (Dexcom G6 Transmitter) yolie Dexcom G6 Transmitter device     spironolactone (ALDACTONE) 25 mg tablet Take 25 mg by mouth two (2) times a day.      pravastatin (PRAVACHOL) 10 mg tablet Take 10 mg by mouth nightly. Indications: high cholesterol     insulin lispro (HUMALOG) 100 unit/mL kwikpen by SubCUTAneous route Before breakfast, lunch, dinner and at bedtime. Indications: type 2 diabetes mellitus     amLODIPine (NORVASC) 5 mg tablet Take 5 mg by mouth daily. Indications: high blood pressure     aspirin delayed-release 81 mg tablet Take 1 Tab by mouth daily. 30 Tab    magnesium oxide (MAG-OX) 400 mg tablet Take 2 Tabs by mouth daily. 60 Tab    metformin (GLUCOPHAGE) 1,000 mg tablet Take 1 Tab by mouth two (2) times daily (with meals). (Patient taking differently: Take 500 mg by mouth two (2) times daily (with meals). ) 1 Tab    glucose blood VI test strips (BLOOD GLUCOSE TEST) strip by Does Not Apply route. 1 box 1 Package    cholecalciferol (Vitamin D3) (2,000 UNITS /50 MCG) cap capsule Take 2,000 Units by mouth daily.  vitamin E (AQUA GEMS) 400 unit capsule Take 400 Units by mouth daily.  glimepiride (AMARYL) 2 mg tablet Take 2 mg by mouth two (2) times a day (before meals).  Ramipril 10 mg Tab Take 10 mg by mouth daily.  carvedilol (COREG) 25 mg tablet Take 25 mg by mouth two (2) times a day. Crestor 10mg daily    No current facility-administered medications for this visit. Review of Systems:     (bold if positive, if negative)    As in HPI   Physical Exam:     Visit Vitals  /70 (BP 1 Location: Left arm, BP Patient Position: Sitting, BP Cuff Size: Adult)   Pulse 64   Resp 18   Ht 5' 5\" (1.651 m)   Wt 187 lb (84.8 kg)   SpO2 99%   BMI 31.12 kg/m²          Gen: Well-developed, well-nourished, in no acute distress  Neck: Supple,No JVD, No Carotid Bruit,   Resp: No accessory muscle use, Clear breath sounds, No rales or rhonchi  Card: Regular Rate,Rythm,Normal S1, S2, No murmurs, rubs or gallop. No thrills.    Abd:  Soft, BS+,   MSK: No cyanosis  Skin: No rashes    Neuro: moving all four extremities , follows commands appropriately  Psych:  Good insight, oriented to person, place , alert, Nml Affect  LE: No edema    EKG:       Medication Side Effects and Warnings were discussed with patient: yes  Patient Labs were reviewed and/or requested:  yes  Patient Past Records were reviewed and/or requested: yes    Total time:       mins    ATTENTION:   This medical record was transcribed using an electronic medical records/speech recognition system. Although proofread, it may and can contain electronic, spelling and other errors. Corrections may be executed at a later time. Please feel free to contact us for any clarifications as needed.       Elly Aiken MD

## 2022-05-10 ENCOUNTER — OFFICE VISIT (OUTPATIENT)
Dept: CARDIOLOGY CLINIC | Age: 77
End: 2022-05-10
Payer: MEDICARE

## 2022-05-10 VITALS
HEART RATE: 64 BPM | BODY MASS INDEX: 31.16 KG/M2 | HEIGHT: 65 IN | OXYGEN SATURATION: 99 % | RESPIRATION RATE: 18 BRPM | SYSTOLIC BLOOD PRESSURE: 122 MMHG | DIASTOLIC BLOOD PRESSURE: 70 MMHG | WEIGHT: 187 LBS

## 2022-05-10 DIAGNOSIS — I47.1 PSVT (PAROXYSMAL SUPRAVENTRICULAR TACHYCARDIA) (HCC): ICD-10-CM

## 2022-05-10 DIAGNOSIS — I10 HYPERTENSION, UNSPECIFIED TYPE: Primary | ICD-10-CM

## 2022-05-10 PROCEDURE — G8432 DEP SCR NOT DOC, RNG: HCPCS | Performed by: INTERNAL MEDICINE

## 2022-05-10 PROCEDURE — G8754 DIAS BP LESS 90: HCPCS | Performed by: INTERNAL MEDICINE

## 2022-05-10 PROCEDURE — G8752 SYS BP LESS 140: HCPCS | Performed by: INTERNAL MEDICINE

## 2022-05-10 PROCEDURE — 1101F PT FALLS ASSESS-DOCD LE1/YR: CPT | Performed by: INTERNAL MEDICINE

## 2022-05-10 PROCEDURE — G8536 NO DOC ELDER MAL SCRN: HCPCS | Performed by: INTERNAL MEDICINE

## 2022-05-10 PROCEDURE — G8427 DOCREV CUR MEDS BY ELIG CLIN: HCPCS | Performed by: INTERNAL MEDICINE

## 2022-05-10 PROCEDURE — G8400 PT W/DXA NO RESULTS DOC: HCPCS | Performed by: INTERNAL MEDICINE

## 2022-05-10 PROCEDURE — 1090F PRES/ABSN URINE INCON ASSESS: CPT | Performed by: INTERNAL MEDICINE

## 2022-05-10 PROCEDURE — G8417 CALC BMI ABV UP PARAM F/U: HCPCS | Performed by: INTERNAL MEDICINE

## 2022-05-10 PROCEDURE — 99214 OFFICE O/P EST MOD 30 MIN: CPT | Performed by: INTERNAL MEDICINE

## 2022-05-10 PROCEDURE — 3017F COLORECTAL CA SCREEN DOC REV: CPT | Performed by: INTERNAL MEDICINE

## 2022-05-10 RX ORDER — ROSUVASTATIN CALCIUM 10 MG/1
10 TABLET, COATED ORAL
COMMUNITY

## 2022-05-10 NOTE — PROGRESS NOTES
Room #8  Echocardiogram 3/22    Job Cabrera is a 77 y.o. female    Chief Complaint   Patient presents with    Follow-up    Fatigue    Hypertension       Chest pain No    SOB No    Dizziness No    Swelling No    Refills No    Visit Vitals  /70 (BP 1 Location: Left arm, BP Patient Position: Sitting, BP Cuff Size: Adult)   Pulse 64   Resp 18   Ht 5' 5\" (1.651 m)   Wt 187 lb (84.8 kg)   SpO2 99%   BMI 31.12 kg/m²       1. Have you been to the ER, urgent care clinic since your last visit? Hospitalized since your last visit? No    2. Have you seen or consulted any other health care providers outside of the [address] [place] [address] since your last visit? Include any pap smears or colon screening.   No

## 2022-05-25 ENCOUNTER — DOCUMENTATION ONLY (OUTPATIENT)
Dept: CARDIOLOGY CLINIC | Age: 77
End: 2022-05-25

## 2022-05-25 ENCOUNTER — HOSPITAL ENCOUNTER (OUTPATIENT)
Dept: CT IMAGING | Age: 77
Discharge: HOME | End: 2022-05-25
Attending: INTERNAL MEDICINE
Payer: MEDICARE

## 2022-05-25 VITALS — SYSTOLIC BLOOD PRESSURE: 131 MMHG | DIASTOLIC BLOOD PRESSURE: 61 MMHG | HEART RATE: 64 BPM

## 2022-05-25 DIAGNOSIS — R94.31 ABNORMAL ELECTROCARDIOGRAM (ECG) (EKG): ICD-10-CM

## 2022-05-25 DIAGNOSIS — R53.83 FATIGUE, UNSPECIFIED TYPE: ICD-10-CM

## 2022-05-25 DIAGNOSIS — E78.5 HYPERLIPIDEMIA, UNSPECIFIED HYPERLIPIDEMIA TYPE: ICD-10-CM

## 2022-05-25 DIAGNOSIS — I10 HYPERTENSION, UNSPECIFIED TYPE: ICD-10-CM

## 2022-05-25 DIAGNOSIS — I47.1 PAROXYSMAL SUPRAVENTRICULAR TACHYCARDIA (HCC): ICD-10-CM

## 2022-05-25 LAB — CREAT BLD-MCNC: 0.9 MG/DL (ref 0.6–1.3)

## 2022-05-25 PROCEDURE — 74011250637 HC RX REV CODE- 250/637: Performed by: INTERNAL MEDICINE

## 2022-05-25 PROCEDURE — 74011000636 HC RX REV CODE- 636: Performed by: INTERNAL MEDICINE

## 2022-05-25 PROCEDURE — 75574 CT ANGIO HRT W/3D IMAGE: CPT

## 2022-05-25 PROCEDURE — 82565 ASSAY OF CREATININE: CPT

## 2022-05-25 RX ORDER — METOPROLOL TARTRATE 5 MG/5ML
5 INJECTION INTRAVENOUS
Status: DISCONTINUED | OUTPATIENT
Start: 2022-05-25 | End: 2022-05-25

## 2022-05-25 RX ORDER — NITROGLYCERIN 0.4 MG/1
0.4 TABLET SUBLINGUAL AS NEEDED
Status: COMPLETED | OUTPATIENT
Start: 2022-05-25 | End: 2022-05-25

## 2022-05-25 RX ADMIN — IOPAMIDOL 125 ML: 755 INJECTION, SOLUTION INTRAVENOUS at 08:27

## 2022-05-25 RX ADMIN — NITROGLYCERIN 0.4 MG: 0.4 TABLET, ORALLY DISINTEGRATING SUBLINGUAL at 08:29

## 2022-05-25 NOTE — PROGRESS NOTES
Assumed care of pt. Introduced self as primary nurse using AIDET, discussed plan of care with pt. Opportunity given for pt to ask questions, pt advised that medical information will be discussed and it is their own responsibility to let nurse know if conversation should not take place in front of visitors. Pt verbalizes understanding. Pt denies any additional complaints at this time. Patient verbalizes back to baseline post procedure. Instructions given. VS in chart.

## 2022-05-25 NOTE — PROGRESS NOTES
Discussed with patient by telephone regarding her recent coronary CTA. Mild calcium deposition/Mild Plaque. No Significant Coronary Lesions. Patient Denies Any Symptoms of Chest Pain/Dyspnea. Can proceed with thyroid surgery from a cardiac standpoint. Sebas Welch MD, [facility] [place]

## 2022-06-01 ENCOUNTER — HOSPITAL ENCOUNTER (EMERGENCY)
Age: 77
Discharge: HOME | End: 2022-06-01
Attending: EMERGENCY MEDICINE
Payer: MEDICARE

## 2022-06-01 VITALS
HEART RATE: 63 BPM | TEMPERATURE: 98.1 F | SYSTOLIC BLOOD PRESSURE: 123 MMHG | BODY MASS INDEX: 30.49 KG/M2 | OXYGEN SATURATION: 97 % | RESPIRATION RATE: 19 BRPM | DIASTOLIC BLOOD PRESSURE: 56 MMHG | WEIGHT: 183 LBS | HEIGHT: 65 IN

## 2022-06-01 DIAGNOSIS — T78.3XXA ANGIOEDEMA, INITIAL ENCOUNTER: Primary | ICD-10-CM

## 2022-06-01 LAB
ANION GAP SERPL CALC-SCNC: 6 MMOL/L (ref 5–15)
ATRIAL RATE: 63 BPM
BASOPHILS # BLD: 0 K/UL (ref 0–0.1)
BASOPHILS NFR BLD: 0 % (ref 0–1)
BUN SERPL-MCNC: 22 MG/DL (ref 6–20)
BUN/CREAT SERPL: 21 (ref 12–20)
CALCIUM SERPL-MCNC: 9.4 MG/DL (ref 8.5–10.1)
CALCULATED P AXIS, ECG09: 53 DEGREES
CALCULATED R AXIS, ECG10: -25 DEGREES
CALCULATED T AXIS, ECG11: -7 DEGREES
CHLORIDE SERPL-SCNC: 106 MMOL/L (ref 97–108)
CO2 SERPL-SCNC: 26 MMOL/L (ref 21–32)
COMMENT, HOLDF: NORMAL
CREAT SERPL-MCNC: 1.07 MG/DL (ref 0.55–1.02)
DIAGNOSIS, 93000: NORMAL
DIFFERENTIAL METHOD BLD: NORMAL
EOSINOPHIL # BLD: 0.2 K/UL (ref 0–0.4)
EOSINOPHIL NFR BLD: 3 % (ref 0–7)
ERYTHROCYTE [DISTWIDTH] IN BLOOD BY AUTOMATED COUNT: 13.2 % (ref 11.5–14.5)
GLUCOSE SERPL-MCNC: 173 MG/DL (ref 65–100)
HCT VFR BLD AUTO: 38.7 % (ref 35–47)
HGB BLD-MCNC: 12.7 G/DL (ref 11.5–16)
IMM GRANULOCYTES # BLD AUTO: 0 K/UL (ref 0–0.04)
IMM GRANULOCYTES NFR BLD AUTO: 0 % (ref 0–0.5)
LYMPHOCYTES # BLD: 2 K/UL (ref 0.8–3.5)
LYMPHOCYTES NFR BLD: 27 % (ref 12–49)
MCH RBC QN AUTO: 30.6 PG (ref 26–34)
MCHC RBC AUTO-ENTMCNC: 32.8 G/DL (ref 30–36.5)
MCV RBC AUTO: 93.3 FL (ref 80–99)
MONOCYTES # BLD: 0.7 K/UL (ref 0–1)
MONOCYTES NFR BLD: 10 % (ref 5–13)
NEUTS SEG # BLD: 4.4 K/UL (ref 1.8–8)
NEUTS SEG NFR BLD: 60 % (ref 32–75)
NRBC # BLD: 0 K/UL (ref 0–0.01)
NRBC BLD-RTO: 0 PER 100 WBC
P-R INTERVAL, ECG05: 144 MS
PLATELET # BLD AUTO: 228 K/UL (ref 150–400)
PMV BLD AUTO: 9.3 FL (ref 8.9–12.9)
POTASSIUM SERPL-SCNC: 4.3 MMOL/L (ref 3.5–5.1)
Q-T INTERVAL, ECG07: 408 MS
QRS DURATION, ECG06: 80 MS
QTC CALCULATION (BEZET), ECG08: 417 MS
RBC # BLD AUTO: 4.15 M/UL (ref 3.8–5.2)
SAMPLES BEING HELD,HOLD: NORMAL
SODIUM SERPL-SCNC: 138 MMOL/L (ref 136–145)
VENTRICULAR RATE, ECG03: 63 BPM
WBC # BLD AUTO: 7.4 K/UL (ref 3.6–11)

## 2022-06-01 PROCEDURE — 80048 BASIC METABOLIC PNL TOTAL CA: CPT

## 2022-06-01 PROCEDURE — 74011000250 HC RX REV CODE- 250: Performed by: EMERGENCY MEDICINE

## 2022-06-01 PROCEDURE — 96374 THER/PROPH/DIAG INJ IV PUSH: CPT

## 2022-06-01 PROCEDURE — 36415 COLL VENOUS BLD VENIPUNCTURE: CPT

## 2022-06-01 PROCEDURE — 74011250636 HC RX REV CODE- 250/636: Performed by: EMERGENCY MEDICINE

## 2022-06-01 PROCEDURE — 93005 ELECTROCARDIOGRAM TRACING: CPT

## 2022-06-01 PROCEDURE — 96375 TX/PRO/DX INJ NEW DRUG ADDON: CPT

## 2022-06-01 PROCEDURE — 99284 EMERGENCY DEPT VISIT MOD MDM: CPT

## 2022-06-01 PROCEDURE — 96372 THER/PROPH/DIAG INJ SC/IM: CPT

## 2022-06-01 PROCEDURE — 85025 COMPLETE CBC W/AUTO DIFF WBC: CPT

## 2022-06-01 RX ORDER — DIPHENHYDRAMINE HYDROCHLORIDE 50 MG/ML
25 INJECTION, SOLUTION INTRAMUSCULAR; INTRAVENOUS
Status: COMPLETED | OUTPATIENT
Start: 2022-06-01 | End: 2022-06-01

## 2022-06-01 RX ORDER — EPINEPHRINE 1 MG/ML
0.3 INJECTION, SOLUTION, CONCENTRATE INTRAVENOUS
Status: COMPLETED | OUTPATIENT
Start: 2022-06-01 | End: 2022-06-01

## 2022-06-01 RX ADMIN — EPINEPHRINE 0.3 MG: 1 INJECTION, SOLUTION, CONCENTRATE INTRAVENOUS at 06:54

## 2022-06-01 RX ADMIN — DIPHENHYDRAMINE HYDROCHLORIDE 25 MG: 50 INJECTION, SOLUTION INTRAMUSCULAR; INTRAVENOUS at 06:52

## 2022-06-01 RX ADMIN — FAMOTIDINE 20 MG: 10 INJECTION, SOLUTION INTRAVENOUS at 06:53

## 2022-06-01 RX ADMIN — METHYLPREDNISOLONE SODIUM SUCCINATE 60 MG: 125 INJECTION, POWDER, FOR SOLUTION INTRAMUSCULAR; INTRAVENOUS at 06:51

## 2022-06-01 NOTE — ED NOTES
Pt. Signed out to me by Dr. Morgan Frey. Pt. Says that she is feeling better. Her tongue has improved and feels less swollen. No trouble breathing or swallowing. Pt.'s sx are likely 2/2 the Ramipril. She agrees to stop this and never take it again. She also agrees to stay away from that entire class of medicine (ACE inhibitors). Pt. Given strict instructions to return to the ER with new or worsening sx. Otherwise, she agrees to f/u with her PCP.     Diane Wagner MD  10:47 AM

## 2022-06-01 NOTE — ED PROVIDER NOTES
71-year-old female with significant past medical history of hypertension on ramipril, type 2 diabetes on insulin, history of SVT status post ablation , hyperlipidemia and pancreatitis presenting ER with report of tongue swelling. Patient reports symptoms started around 3 AM.  Some swelling of the right side of the tongue that then spread to the left side of the tongue. Patient has reported some fully swallowing. However reports that the tongue seems to be slightly improving despite not taking any medications. Patient denies any itchiness no stridor or wheezing. Patient has never had this occur previously. Patient denies any new foods soaps or detergents. Past Medical History:   Diagnosis Date    Arthritis     Diabetes ([address]     Goiter     5 total 1 resting on \"windpipe\".       HX OTHER MEDICAL     strep/ tonsillitis    Hypercholesterolemia     Hypertension     Pancreatitis     [facility]T [facility]ra[facility] [address]     ablation in        Past Surgical History:   Procedure Laterality Date    COLONOSCOPY N/A 2019    COLONOSCOPY performed by Jesse Grossman MD at [facility] HX HYSTERECTOMY      HX [place] [address]  2013    ablation         Family History:   Problem Relation Age of Onset    Cancer Father 61        multi myloma    Cancer Brother 52        colon/liver    Cancer Daughter 29        cancerous colon polyps       Social History     Socioeconomic History    Marital status:      Spouse name: Not on file    Number of children: Not on file    Years of education: Not on file    Highest education level: Not on file   Occupational History    Not on file   Tobacco Use    Smoking status: Former Smoker     Packs/day: 1.00     Years: 17.00     Pack years: 17.00     Quit date: 1979     Years since quittin.0    Smokeless tobacco: Never Used   Substance and Sexual Activity    Alcohol use: No    Drug use: No    Sexual activity: Not on file   Other Topics Concern    Not on file   Social History Narrative    Not on file     Social Determinants of Health     Financial Resource Strain:     Difficulty of Paying Living Expenses: Not on file   Food Insecurity:     Worried About Running Out of Food in the Last Year: Not on file    Boogie of Food in the Last Year: Not on file   Transportation Needs:     Lack of Transportation (Medical): Not on file    Lack of Transportation (Non-Medical): Not on file   Physical Activity:     Days of Exercise per Week: Not on file    Minutes of Exercise per Session: Not on file   Stress:     Feeling of Stress : Not on file   Social Connections:     Frequency of Communication with Friends and Family: Not on file    Frequency of Social Gatherings with Friends and Family: Not on file    Attends [employer]ism Services: Not on file    Active Member of [address] [place] [address] [employer] or Organizations: Not on file    Attends Club or Organization Meetings: Not on file    Marital Status: Not on file   Intimate Partner Violence:     Fear of Current or Ex-Partner: Not on file    Emotionally Abused: Not on file    Physically Abused: Not on file    Sexually Abused: Not on file   Housing Stability:     Unable to Pay for Housing in the Last Year: Not on file    Number of [place] in the Last Year: Not on file    Unstable Housing in the Last Year: Not on file         ALLERGIES: Hydrochlorothiazide, Morphine, Percocet [oxycodone-acetaminophen], and Vicodin [hydrocodone-acetaminophen]    Review of Systems   Constitutional: Negative for chills and fever. HENT: Positive for facial swelling (tongue) and trouble swallowing. Negative for congestion and sore throat. Eyes: Negative for pain. Respiratory: Negative for shortness of breath, wheezing and stridor. Cardiovascular: Negative for chest pain. Gastrointestinal: Negative for abdominal pain, diarrhea, nausea and vomiting.    Genitourinary: Negative for dysuria and flank pain. Musculoskeletal: Negative for back pain and neck pain. Skin: Negative for rash. Neurological: Negative for dizziness and headaches. All other systems reviewed and are negative. Vitals:    06/01/22 0630   BP: 126/68   Pulse: 64   Resp: 16   Temp: 98.1 °F (36.7 °C)   SpO2: 100%   Weight: 83 kg (183 lb)   Height: 5' 5\" (1.651 m)            Physical Exam  Vitals and nursing note reviewed. Constitutional:       Appearance: She is well-developed. HENT:      Head: Normocephalic. Mouth/Throat:      Lips: Pink. Mouth: Mucous membranes are moist. Angioedema (tongue swelling) present. No oral lesions. Tongue: No lesions. Palate: No lesions. Pharynx: Oropharynx is clear. Comments: No lip swelling or facial swelling  Eyes:      Conjunctiva/sclera: Conjunctivae normal.   Cardiovascular:      Rate and Rhythm: Normal rate and regular rhythm. Pulmonary:      Effort: Pulmonary effort is normal. No respiratory distress. Breath sounds: Normal breath sounds. No stridor. No wheezing. Abdominal:      General: Bowel sounds are normal.      Palpations: Abdomen is soft. Tenderness: There is no abdominal tenderness. Musculoskeletal:         General: Normal range of motion. Cervical back: Normal range of motion and neck supple. Skin:     General: Skin is warm. Capillary Refill: Capillary refill takes less than 2 seconds. Findings: No rash. Neurological:      Mental Status: She is alert and oriented to person, place, and time. Comments: No gross motor or sensory deficits        EKG normal sinus rhythm rate of 61 beats minute with normal intervals. Normal axis. LVH criteria. No ST elevation or depressions. EKG interpreted by Renay Matos MD    [facility]  ED Course as of 06/01/22 0711   Wed Jun 01, 2022   0708 7:09 AM  Change of shift. Care of patient signed over to Dr. Ricky Sultana. Bedside handoff complete.     [ZD]      ED Course User Index  [ZD] Jacqui Wood MD       Procedures

## 2022-06-01 NOTE — ED NOTES
Patient resting comfortably. Reports decreased swelling and clearer speech. Warm blanket provided.  VSS

## 2022-06-01 NOTE — ED NOTES
Verbal shift change report given to Keyla Dorado RN (oncoming nurse) by Maxi Landon RN (offgoing nurse). Report included the following information SBAR, MAR and Recent Results.

## 2022-06-01 NOTE — ED NOTES
Emergency Department Nursing Plan of Care       The Nursing Plan of Care is developed from the Nursing assessment and Emergency Department Attending provider initial evaluation. The plan of care may be reviewed in the ED Provider note.     The Plan of Care was developed with the following considerations:   Patient / Family readiness to learn indicated by:verbalized understanding  Persons(s) to be included in education: patient  Barriers to Learning/Limitations:No    Signed     Laisha Velasquez RN    6/1/2022   7:14 AM

## 2022-06-01 NOTE — ED TRIAGE NOTES
Patient to ED for tongue swelling since 0300. Patient is handling secretions well but states it's somewhat difficult to swallow. Patient states at about 1pm yesterday ate some chicken from grill and then at 10pm had some sausage from the grill leftover from yesterday. Patient denies any difficulty breathing or any known food allergies. Pt also reports diarrhea prior to tongue swelling, denies nausea or vomiting. Denies chills.

## 2022-06-03 ENCOUNTER — APPOINTMENT (OUTPATIENT)
Dept: GENERAL RADIOLOGY | Age: 77
End: 2022-06-03
Attending: STUDENT IN AN ORGANIZED HEALTH CARE EDUCATION/TRAINING PROGRAM
Payer: MEDICARE

## 2022-06-03 ENCOUNTER — HOSPITAL ENCOUNTER (EMERGENCY)
Age: 77
Discharge: HOME | End: 2022-06-03
Attending: STUDENT IN AN ORGANIZED HEALTH CARE EDUCATION/TRAINING PROGRAM
Payer: MEDICARE

## 2022-06-03 VITALS
OXYGEN SATURATION: 98 % | DIASTOLIC BLOOD PRESSURE: 73 MMHG | WEIGHT: 181 LBS | BODY MASS INDEX: 30.16 KG/M2 | TEMPERATURE: 98.1 F | HEART RATE: 60 BPM | HEIGHT: 65 IN | SYSTOLIC BLOOD PRESSURE: 126 MMHG | RESPIRATION RATE: 16 BRPM

## 2022-06-03 DIAGNOSIS — R06.02 SOB (SHORTNESS OF BREATH): ICD-10-CM

## 2022-06-03 DIAGNOSIS — E04.9 GOITER: ICD-10-CM

## 2022-06-03 DIAGNOSIS — R00.2 PALPITATIONS: Primary | ICD-10-CM

## 2022-06-03 LAB
ALBUMIN SERPL-MCNC: 3.9 G/DL (ref 3.5–5)
ALBUMIN/GLOB SERPL: 1 {RATIO} (ref 1.1–2.2)
ALP SERPL-CCNC: 75 U/L (ref 45–117)
ALT SERPL-CCNC: 45 U/L (ref 12–78)
ANION GAP SERPL CALC-SCNC: 4 MMOL/L (ref 5–15)
APPEARANCE UR: CLEAR
AST SERPL-CCNC: 26 U/L (ref 15–37)
ATRIAL RATE: 59 BPM
ATRIAL RATE: 61 BPM
BACTERIA URNS QL MICRO: NEGATIVE /HPF
BASOPHILS # BLD: 0 K/UL (ref 0–0.1)
BASOPHILS NFR BLD: 0 % (ref 0–1)
BILIRUB SERPL-MCNC: 0.4 MG/DL (ref 0.2–1)
BILIRUB UR QL: NEGATIVE
BUN SERPL-MCNC: 24 MG/DL (ref 6–20)
BUN/CREAT SERPL: 26 (ref 12–20)
CALCIUM SERPL-MCNC: 9.3 MG/DL (ref 8.5–10.1)
CALCULATED P AXIS, ECG09: 58 DEGREES
CALCULATED P AXIS, ECG09: 59 DEGREES
CALCULATED R AXIS, ECG10: -24 DEGREES
CALCULATED R AXIS, ECG10: -24 DEGREES
CALCULATED T AXIS, ECG11: -4 DEGREES
CALCULATED T AXIS, ECG11: 10 DEGREES
CHLORIDE SERPL-SCNC: 106 MMOL/L (ref 97–108)
CO2 SERPL-SCNC: 28 MMOL/L (ref 21–32)
COLOR UR: ABNORMAL
COMMENT, HOLDF: NORMAL
CREAT SERPL-MCNC: 0.91 MG/DL (ref 0.55–1.02)
D DIMER PPP FEU-MCNC: 0.35 MG/L FEU (ref 0–0.65)
DIAGNOSIS, 93000: NORMAL
DIAGNOSIS, 93000: NORMAL
DIFFERENTIAL METHOD BLD: NORMAL
EOSINOPHIL # BLD: 0.2 K/UL (ref 0–0.4)
EOSINOPHIL NFR BLD: 3 % (ref 0–7)
EPITH CASTS URNS QL MICRO: ABNORMAL /LPF
ERYTHROCYTE [DISTWIDTH] IN BLOOD BY AUTOMATED COUNT: 13.3 % (ref 11.5–14.5)
GLOBULIN SER CALC-MCNC: 4.1 G/DL (ref 2–4)
GLUCOSE SERPL-MCNC: 111 MG/DL (ref 65–100)
GLUCOSE UR STRIP.AUTO-MCNC: NEGATIVE MG/DL
HCT VFR BLD AUTO: 40.9 % (ref 35–47)
HGB BLD-MCNC: 13.7 G/DL (ref 11.5–16)
HGB UR QL STRIP: ABNORMAL
HYALINE CASTS URNS QL MICRO: ABNORMAL /LPF (ref 0–2)
IMM GRANULOCYTES # BLD AUTO: 0 K/UL (ref 0–0.04)
IMM GRANULOCYTES NFR BLD AUTO: 0 % (ref 0–0.5)
KETONES UR QL STRIP.AUTO: NEGATIVE MG/DL
LEUKOCYTE ESTERASE UR QL STRIP.AUTO: NEGATIVE
LYMPHOCYTES # BLD: 2.5 K/UL (ref 0.8–3.5)
LYMPHOCYTES NFR BLD: 32 % (ref 12–49)
MAGNESIUM SERPL-MCNC: 2 MG/DL (ref 1.6–2.4)
MCH RBC QN AUTO: 30.6 PG (ref 26–34)
MCHC RBC AUTO-ENTMCNC: 33.5 G/DL (ref 30–36.5)
MCV RBC AUTO: 91.3 FL (ref 80–99)
MONOCYTES # BLD: 0.7 K/UL (ref 0–1)
MONOCYTES NFR BLD: 9 % (ref 5–13)
NEUTS SEG # BLD: 4.4 K/UL (ref 1.8–8)
NEUTS SEG NFR BLD: 56 % (ref 32–75)
NITRITE UR QL STRIP.AUTO: NEGATIVE
NRBC # BLD: 0 K/UL (ref 0–0.01)
NRBC BLD-RTO: 0 PER 100 WBC
P-R INTERVAL, ECG05: 144 MS
P-R INTERVAL, ECG05: 148 MS
PH UR STRIP: 7 [PH] (ref 5–8)
PLATELET # BLD AUTO: 251 K/UL (ref 150–400)
PMV BLD AUTO: 9.3 FL (ref 8.9–12.9)
POTASSIUM SERPL-SCNC: 4.2 MMOL/L (ref 3.5–5.1)
PROT SERPL-MCNC: 8 G/DL (ref 6.4–8.2)
PROT UR STRIP-MCNC: NEGATIVE MG/DL
Q-T INTERVAL, ECG07: 398 MS
Q-T INTERVAL, ECG07: 406 MS
QRS DURATION, ECG06: 86 MS
QRS DURATION, ECG06: 94 MS
QTC CALCULATION (BEZET), ECG08: 394 MS
QTC CALCULATION (BEZET), ECG08: 408 MS
RBC # BLD AUTO: 4.48 M/UL (ref 3.8–5.2)
RBC #/AREA URNS HPF: ABNORMAL /HPF (ref 0–5)
SAMPLES BEING HELD,HOLD: NORMAL
SODIUM SERPL-SCNC: 138 MMOL/L (ref 136–145)
SP GR UR REFRACTOMETRY: 1.01 (ref 1–1.03)
TROPONIN-HIGH SENSITIVITY: 14 NG/L (ref 0–51)
TROPONIN-HIGH SENSITIVITY: 19 NG/L (ref 0–51)
UR CULT HOLD, URHOLD: NORMAL
UROBILINOGEN UR QL STRIP.AUTO: 0.2 EU/DL (ref 0.2–1)
VENTRICULAR RATE, ECG03: 59 BPM
VENTRICULAR RATE, ECG03: 61 BPM
WBC # BLD AUTO: 7.8 K/UL (ref 3.6–11)
WBC URNS QL MICRO: ABNORMAL /HPF (ref 0–4)

## 2022-06-03 PROCEDURE — 36415 COLL VENOUS BLD VENIPUNCTURE: CPT

## 2022-06-03 PROCEDURE — 93005 ELECTROCARDIOGRAM TRACING: CPT

## 2022-06-03 PROCEDURE — 71046 X-RAY EXAM CHEST 2 VIEWS: CPT

## 2022-06-03 PROCEDURE — 83735 ASSAY OF MAGNESIUM: CPT

## 2022-06-03 PROCEDURE — 85379 FIBRIN DEGRADATION QUANT: CPT

## 2022-06-03 PROCEDURE — 84484 ASSAY OF TROPONIN QUANT: CPT

## 2022-06-03 PROCEDURE — 99285 EMERGENCY DEPT VISIT HI MDM: CPT

## 2022-06-03 PROCEDURE — 85025 COMPLETE CBC W/AUTO DIFF WBC: CPT

## 2022-06-03 PROCEDURE — 80053 COMPREHEN METABOLIC PANEL: CPT

## 2022-06-03 PROCEDURE — 81001 URINALYSIS AUTO W/SCOPE: CPT

## 2022-06-03 NOTE — ED TRIAGE NOTES
Patient reports intermittent palpitations and shortness of breath since 0630- she is ambulatory to triage in no acute distress.

## 2022-06-03 NOTE — DISCHARGE INSTRUCTIONS
- Call to schedule an appointment with electrophysiology through your cardiologist to be reevaluated and set up for Holter monitoring  - Follow up with your endocrinologist as scheduled  - Return for fevers, chest pain, worsening shortness of breath, dizziness or passing out, or any new or worsening symptoms

## 2022-06-03 NOTE — ED PROVIDER NOTES
Chief Complaint   Patient presents with    Palpitations    Shortness of Breath     This is a 81-year-old female with a history of hypertension, diabetes, SVT status post cardiac ablation in 2013, presenting with palpitations and shortness of breath that started this morning. She was in her normal state of health yesterday, woke up and did her usual walk down the street. When she returned home felt that her heart was racing, was short of breath, symptoms were reminiscent of the last time she was in SVT but resolved by the time she arrived in the department. Denies chest pain, dizziness or syncope. No fevers, cough, or recent respiratory illness. She denies any abdominal pain, vomiting or diarrhea, or urinary symptoms. No significant caffeine intake today. No lower extremity pain or edema, or history of venous thromboembolism. No other systemic complaints. Symptoms are moderate in nature. Past Medical History:   Diagnosis Date    Arthritis     Diabetes ([address]     Goiter     5 total 1 resting on \"windpipe\".       HX OTHER MEDICAL     strep/ tonsillitis    Hypercholesterolemia     Hypertension     Pancreatitis 2019    SVT (supraventricular tachycardia) ([address]     ablation in 2013       Past Surgical History:   Procedure Laterality Date    COLONOSCOPY N/A 6/11/2019    COLONOSCOPY performed by Vickie Gu MD at [facility] [place]X HYSTERECTOMY      HX [place]  2013    ablation         Family History:   Problem Relation Age of Onset    Cancer Father 61        multi myloma    Cancer Brother 52        colon/liver    Cancer Daughter 29        cancerous colon polyps       Social History     Socioeconomic History    Marital status:      Spouse name: Not on file    Number of children: Not on file    Years of education: Not on file    Highest education level: Not on file   Occupational History    Not on file   Tobacco Use    Smoking status: Former Smoker     Packs/day: 1.00     Years: 17.00     Pack years: 17.00     Quit date: 1979     Years since quittin.0    Smokeless tobacco: Never Used   Substance and Sexual Activity    Alcohol use: No    Drug use: No    Sexual activity: Not on file   Other Topics Concern    Not on file   Social History Narrative    Not on file     Social Determinants of Health     Financial Resource Strain:     Difficulty of Paying Living Expenses: Not on file   Food Insecurity:     Worried About Running Out of Food in the Last Year: Not on file    Boogie of Food in the Last Year: Not on file   Transportation Needs:     Lack of Transportation (Medical): Not on file    Lack of Transportation (Non-Medical): Not on file   Physical Activity:     Days of Exercise per Week: Not on file    Minutes of Exercise per Session: Not on file   Stress:     Feeling of Stress : Not on file   Social Connections:     Frequency of Communication with Friends and Family: Not on file    Frequency of Social Gatherings with Friends and Family: Not on file    Attends Jehovah's witness Services: Not on file    Active Member of [address] [place] [address] or Organizations: Not on file    Attends Club or Organization Meetings: Not on file    Marital Status: Not on file   Intimate Partner Violence:     Fear of Current or Ex-Partner: Not on file    Emotionally Abused: Not on file    Physically Abused: Not on file    Sexually Abused: Not on file   Housing Stability:     Unable to Pay for Housing in the Last Year: Not on file    Number of [place] in the Last Year: Not on file    Unstable Housing in the Last Year: Not on file         ALLERGIES: Ace inhibitors, Hydrochlorothiazide, Morphine, Percocet [oxycodone-acetaminophen], and Vicodin [hydrocodone-acetaminophen]    Review of Systems   Constitutional: Negative for fever. HENT: Negative for facial swelling. Eyes: Negative for redness. Respiratory: Positive for shortness of breath. Cardiovascular: Positive for palpitations. Negative for chest pain and leg swelling. Gastrointestinal: Negative for abdominal pain, diarrhea and vomiting. Genitourinary: Negative for difficulty urinating. Musculoskeletal: Negative for gait problem. Neurological: Negative for dizziness and syncope. Psychiatric/Behavioral: Negative for confusion.        Vitals:    06/03/22 0939 06/03/22 1239   BP: (!) 176/79 126/73   Pulse: 61 60   Resp: 17 16   Temp: 98.5 °F (36.9 °C) 98.1 °F (36.7 °C)   SpO2: 97% 98%   Weight: 82.1 kg (181 lb)    Height: 5' 5\" (1.651 m)             Physical Exam  General:  Awake and alert, NAD  HEENT:  NC/AT, equal pupils, moist mucous membranes  Neck:   Normal inspection, full range of motion  Cardiac:  RRR, no murmurs  Respiratory:  Clear bilaterally, no wheezes, rales, rhonchi  Abdomen:  Soft and nontender, nondistended  Extremities: Warm and well perfused, no peripheral edema, no calf tenderness, symmetric DP pulses  Neuro:  Moving all extremities symmetrically without gross motor deficit  Skin:   No rashes or pallor    RESULTS  Recent Results (from the past 12 hour(s))   EKG, 12 LEAD, INITIAL    Collection Time: 06/03/22  9:34 AM   Result Value Ref Range    Ventricular Rate 59 BPM    Atrial Rate 59 BPM    P-R Interval 148 ms    QRS Duration 94 ms    Q-T Interval 398 ms    QTC Calculation (Bezet) 394 ms    Calculated P Axis 59 degrees    Calculated R Axis -24 degrees    Calculated T Axis 10 degrees    Diagnosis       Sinus bradycardia  Possible Left atrial enlargement  Left ventricular hypertrophy ( R in aVL , [employer] product )  Cannot rule out Septal infarct , age undetermined  Abnormal ECG  When compared with ECG of 01-JUN-2022 06:47,  MANUAL COMPARISON REQUIRED, DATA IS UNCONFIRMED     SAMPLES BEING HELD    Collection Time: 06/03/22 10:02 AM   Result Value Ref Range    SAMPLES BEING HELD 1RED,1SST     COMMENT        Add-on orders for these samples will be processed based on acceptable specimen integrity and analyte stability, which may vary by analyte. CBC WITH AUTOMATED DIFF    Collection Time: 06/03/22 10:02 AM   Result Value Ref Range    WBC 7.8 3.6 - 11.0 K/uL    RBC 4.48 3.80 - 5.20 M/uL    HGB 13.7 11.5 - 16.0 g/dL    HCT 40.9 35.0 - 47.0 %    MCV 91.3 80.0 - 99.0 FL    MCH 30.6 26.0 - 34.0 PG    MCHC 33.5 30.0 - 36.5 g/dL    RDW 13.3 11.5 - 14.5 %    PLATELET 956 884 - 946 K/uL    MPV 9.3 8.9 - 12.9 FL    NRBC 0.0 0  WBC    ABSOLUTE NRBC 0.00 0.00 - 0.01 K/uL    NEUTROPHILS 56 32 - 75 %    LYMPHOCYTES 32 12 - 49 %    MONOCYTES 9 5 - 13 %    EOSINOPHILS 3 0 - 7 %    BASOPHILS 0 0 - 1 %    IMMATURE GRANULOCYTES 0 0.0 - 0.5 %    ABS. NEUTROPHILS 4.4 1.8 - 8.0 K/UL    ABS. LYMPHOCYTES 2.5 0.8 - 3.5 K/UL    ABS. MONOCYTES 0.7 0.0 - 1.0 K/UL    ABS. EOSINOPHILS 0.2 0.0 - 0.4 K/UL    ABS. BASOPHILS 0.0 0.0 - 0.1 K/UL    ABS. IMM. GRANS. 0.0 0.00 - 0.04 K/UL    DF AUTOMATED     METABOLIC PANEL, COMPREHENSIVE    Collection Time: 06/03/22 10:02 AM   Result Value Ref Range    Sodium 138 136 - 145 mmol/L    Potassium 4.2 3.5 - 5.1 mmol/L    Chloride 106 97 - 108 mmol/L    CO2 28 21 - 32 mmol/L    Anion gap 4 (L) 5 - 15 mmol/L    Glucose 111 (H) 65 - 100 mg/dL    BUN 24 (H) 6 - 20 MG/DL    Creatinine 0.91 0.55 - 1.02 MG/DL    BUN/Creatinine ratio 26 (H) 12 - 20      GFR est AA >60 >60 ml/min/1.73m2    GFR est non-AA >60 >60 ml/min/1.73m2    Calcium 9.3 8.5 - 10.1 MG/DL    Bilirubin, total 0.4 0.2 - 1.0 MG/DL    ALT (SGPT) 45 12 - 78 U/L    AST (SGOT) 26 15 - 37 U/L    Alk.  phosphatase 75 45 - 117 U/L    Protein, total 8.0 6.4 - 8.2 g/dL    Albumin 3.9 3.5 - 5.0 g/dL    Globulin 4.1 (H) 2.0 - 4.0 g/dL    A-G Ratio 1.0 (L) 1.1 - 2.2     TROPONIN-HIGH SENSITIVITY    Collection Time: 06/03/22 10:02 AM   Result Value Ref Range    Troponin-High Sensitivity 19 0 - 51 ng/L   D DIMER    Collection Time: 06/03/22 10:02 AM   Result Value Ref Range    D-dimer 0.35 0.00 - 0.65 mg/L U MAGNESIUM    Collection Time: 06/03/22 10:02 AM   Result Value Ref Range    Magnesium 2.0 1.6 - 2.4 mg/dL   URINALYSIS W/MICROSCOPIC    Collection Time: 06/03/22 10:06 AM   Result Value Ref Range    Color YELLOW/STRAW      Appearance CLEAR CLEAR      Specific gravity 1.008 1.003 - 1.030      pH (UA) 7.0 5.0 - 8.0      Protein Negative NEG mg/dL    Glucose Negative NEG mg/dL    Ketone Negative NEG mg/dL    Bilirubin Negative NEG      Blood TRACE (A) NEG      Urobilinogen 0.2 0.2 - 1.0 EU/dL    Nitrites Negative NEG      Leukocyte Esterase Negative NEG      WBC 0-4 0 - 4 /hpf    RBC 0-5 0 - 5 /hpf    Epithelial cells FEW FEW /lpf    Bacteria Negative NEG /hpf    Hyaline cast 0-2 0 - 2 /lpf   URINE CULTURE HOLD SAMPLE    Collection Time: 06/03/22 10:06 AM    Specimen: Serum; Urine   Result Value Ref Range    Urine culture hold        Urine on hold in Microbiology dept for 2 days. If unpreserved urine is submitted, it cannot be used for addtional testing after 24 hours, recollection will be required. TROPONIN-HIGH SENSITIVITY    Collection Time: 06/03/22 11:51 AM   Result Value Ref Range    Troponin-High Sensitivity 14 0 - 51 ng/L        IMAGING  XR CHEST PA LAT    Result Date: 6/3/2022  1. No acute pulmonary findings. 2. Evidence for interval large right thyroid mass with leftward deviation of the trachea. Procedures - none unless documented below  EKG as interpreted by me:  Sinus bradycardia at a rate of 59, left axis deviation, normal intervals, +LVH by aVL criteria, grossly no ST or T-wave changes suggesting acute ischemia. ED course: Labs, EKG and imaging reviewed. Dimer negative so lower suspicion for pulmonary embolism. 12-lead indicates sinus rhythm without ischemic changes. Chest film without underlying pulmonary process but re-demonstrates a very large goiter that is being followed by endocrinology and for which surgery is being scheduled.   She's clinically well appearing with stable vital signs, serial troponins unremarkable based on our institutional HS troponin algorithm and she denies any active chest pain. Certainly possible that she may have been in an arrhythmia at the time she was symptomatic and this may have resolved by the time she arrived here. Will need reevaluation by EP and likely Holter monitoring as an outpatient. Will discharge home, strict return precautions communicated. The patient has been given verbal and written advice regarding today's presentation including reasons to re-attend, specifically signs and symptoms of concern or worsening condition were discussed and understood by the patient.      Impression: Palpitations, shortness of breath  Disposition: Discharge home

## 2022-06-30 ENCOUNTER — OFFICE VISIT (OUTPATIENT)
Dept: CARDIOLOGY CLINIC | Age: 77
End: 2022-06-30
Payer: MEDICARE

## 2022-06-30 VITALS
BODY MASS INDEX: 30.66 KG/M2 | OXYGEN SATURATION: 99 % | HEIGHT: 65 IN | WEIGHT: 184 LBS | SYSTOLIC BLOOD PRESSURE: 136 MMHG | DIASTOLIC BLOOD PRESSURE: 76 MMHG | HEART RATE: 64 BPM

## 2022-06-30 DIAGNOSIS — E78.5 DYSLIPIDEMIA: ICD-10-CM

## 2022-06-30 DIAGNOSIS — I10 ESSENTIAL HYPERTENSION: ICD-10-CM

## 2022-06-30 DIAGNOSIS — I47.1 PSVT (PAROXYSMAL SUPRAVENTRICULAR TACHYCARDIA) (HCC): Primary | ICD-10-CM

## 2022-06-30 PROCEDURE — G8417 CALC BMI ABV UP PARAM F/U: HCPCS | Performed by: INTERNAL MEDICINE

## 2022-06-30 PROCEDURE — G8400 PT W/DXA NO RESULTS DOC: HCPCS | Performed by: INTERNAL MEDICINE

## 2022-06-30 PROCEDURE — 3017F COLORECTAL CA SCREEN DOC REV: CPT | Performed by: INTERNAL MEDICINE

## 2022-06-30 PROCEDURE — G8427 DOCREV CUR MEDS BY ELIG CLIN: HCPCS | Performed by: INTERNAL MEDICINE

## 2022-06-30 PROCEDURE — 1101F PT FALLS ASSESS-DOCD LE1/YR: CPT | Performed by: INTERNAL MEDICINE

## 2022-06-30 PROCEDURE — G8432 DEP SCR NOT DOC, RNG: HCPCS | Performed by: INTERNAL MEDICINE

## 2022-06-30 PROCEDURE — G8754 DIAS BP LESS 90: HCPCS | Performed by: INTERNAL MEDICINE

## 2022-06-30 PROCEDURE — 1090F PRES/ABSN URINE INCON ASSESS: CPT | Performed by: INTERNAL MEDICINE

## 2022-06-30 PROCEDURE — G8752 SYS BP LESS 140: HCPCS | Performed by: INTERNAL MEDICINE

## 2022-06-30 PROCEDURE — 99214 OFFICE O/P EST MOD 30 MIN: CPT | Performed by: INTERNAL MEDICINE

## 2022-06-30 PROCEDURE — G8536 NO DOC ELDER MAL SCRN: HCPCS | Performed by: INTERNAL MEDICINE

## 2022-06-30 PROCEDURE — 1123F ACP DISCUSS/DSCN MKR DOCD: CPT | Performed by: INTERNAL MEDICINE

## 2022-06-30 NOTE — PROGRESS NOTES
Chloe Corrigan is a 77 y.o. female    Visit Vitals  /76 (BP 1 Location: Left upper arm, BP Patient Position: Sitting, BP Cuff Size: Adult)   Pulse 64   Ht 5' 5\" (1.651 m)   Wt 184 lb (83.5 kg)   SpO2 99%   BMI 30.62 kg/m²       No chief complaint on file. Chest pain NO  SOB NO  Dizziness NO  Swelling NO  Recent hospital visit [address] [place] [address] YES  HAD COVID?  YES

## 2022-06-30 NOTE — PROGRESS NOTES
Ibis Gagnon MD., [facility]    [address] [facility] [place], [address] [facility] [address]    Office [phone],Z [phone]           Zachary Peacock is here for a f/u office visit. Primary care physician:  Sarah Quinones DO    CC - as documented in EMR    Dear Dr. Sarah Quinones DO    I had the pleasure of seeing Ms. Zachary Peacock in the office today. Assessment:     Nonobstructive CAD by coronary CTA 5/25/2022-asymptomatic  History of SVT-s/p ablation 2013  Hypertension  Hyperlipidemia  Diabetes mellitus-not controlled  Goiter - needs surgery ;          Plan:        Echo-3/2022-EF normal  Blood pressure controlled. Lipids controlled per patient. On Crestor  Wearing event monitor 5/17/2022-6/17/22-no significant arrhythmias so far. PVC noted. Will finish wearing the monitor till 6/17/2022. Class 2 risk - 1 risk factor (6 %)  from cardiac standpoint for surgery as per Sohail's Revised cardiac risk  Index ( 30 day risk of death, MI, cardiac arrest). D/w pt. Patient understands the cardiac risk and wishes to proceed. Can proceed with surgery. Follow-up 6 months/earlier as needed. Aggressive cardiovascular risk for modification. Patient understands the plan. All questions were answered to the patient's satisfaction. I appreciate the opportunity to be involved in Ms. Sun. See note below for details. Please do not hesitate to contact us with questions or concerns. Ibis Gagnon MD      Cardiac Testing/ Procedures: A. Cardiac Cath/PCI:     B. ECHO/MICHELA: 3/2022       Left Ventricle: Left ventricle size is normal. Normal wall thickness (upper normal). Sigmoid septum. Normal wall motion. Normal left ventricular systolic function. EF by 2D Simpsons Biplane is 69%. Diastolic function present with increased LAP with normal LV EF.   Left Atrium: Left atrial volume index is mildly increased (35-41 mL/m2).     Mitral Valve: Mild transvalvular regurgitation.   Tricuspid Valve: Mild transvalvular regurgitation. RVSP is 33 mmHg.   Pulmonic Valve: Mild transvalvular regurgitation        C. StressNuclear/Stress ECHO/Stress test:     D. Vascular:     E. EP: 2013 - SVT ablation     F. Miscellaneous: 5/25/22  Cor CTA  1. Left main originate normally from left coronary cusp and is normal size  without any significant atherosclerotic plaque or calcification. 2. The LAD is normal size vessel. There is noncalcified plaque with 50-60%  luminal stenosis in most views of the proximal and ostial LAD. Mid LAD  demonstrate mild calcified plaque without significant luminal stenosis. The D1  diagonal branch demonstrate ostial calcified plaque with 30-40% luminal  stenosis. The D2 diagonal branches does not demonstrate any disease or  calcification. There is no myocardial bridging seen. 3. The left circumflex is normal size vessel. There is ostial calcified plaque  with 30-40% luminal stenosis. The OM1, OM 2 and posterolateral branches does not  demonstrate any significant disease or calcification. 4. The RCA is normal size vessel and originate normally from the right coronary  cusp. There is no significant disease in the RCA and PDA branches. 5. The aortic valve does not demonstrate calcification. The mitral annulus does  not demonstrate calcification. 6. Visible portions of the ascending aorta minimal calcified plaque. Visible  portions of descending thoracic aorta minimal calcified plaque. The aortic arch  demonstrate mild calcified plaque. 7. The ascending aorta measures 30 mm. 8. The extracardiac portion of this test will be read separately by staff  radiologist.      Total coronary calcium score: 78    History:     Komal Vargas is a 77 y.o. female who returns for follow up visit.     No CP/dyspnea/swelling LE/occasional palpitations      ( Initial visit - 5/22/22  Komal Vargas is a 77 y.o. female who is referred establish cardiac care/prior to thyroid surgery    Patient used to be followed by Dr. Eilna Cope. She has had SVT ablation 2013. Remote history of cardiac work-up including stress test being negative per patient. Complains of fatigue. No chest pain/dyspnea. No swelling lower extremities. No palpitations. Home SBP in 120 -130s    Last HbA1c - 7.2 -     80-90s SVT; Was on medications; continued to have breakthrough episodes - Had ablation 2013 - since then no breakthrough episodes)    ROS:  (bold if positive, if negative)           Medications:       Current Outpatient Medications   Medication Sig Dispense    rosuvastatin (Crestor) 10 mg tablet Take 10 mg by mouth nightly.  insulin glargine,hum.rec.anlog (TOUJEO MAX U-300 SOLOSTAR SC) 45 Units by SubCUTAneous route.  VITAMIN B COMPLEX PO Take 1 Tablet by mouth daily. 400 iu daily     Blood-Glucose Transmitter (Dexcom G6 Transmitter) yolie Dexcom G6 Transmitter device     spironolactone (ALDACTONE) 25 mg tablet Take 25 mg by mouth two (2) times a day.  insulin lispro (HUMALOG) 100 unit/mL kwikpen by SubCUTAneous route Before breakfast, lunch, dinner and at bedtime. Indications: type 2 diabetes mellitus     amLODIPine (NORVASC) 5 mg tablet Take 5 mg by mouth daily. Indications: high blood pressure     aspirin delayed-release 81 mg tablet Take 1 Tab by mouth daily. 30 Tab    magnesium oxide (MAG-OX) 400 mg tablet Take 2 Tabs by mouth daily. (Patient taking differently: Take 400 mg by mouth two (2) times a day.) 60 Tab    metformin (GLUCOPHAGE) 1,000 mg tablet Take 1 Tab by mouth two (2) times daily (with meals). (Patient taking differently: Take 500 mg by mouth two (2) times daily (with meals). ) 1 Tab    cholecalciferol (Vitamin D3) (2,000 UNITS /50 MCG) cap capsule Take 2,000 Units by mouth daily.  vitamin E (AQUA GEMS) 400 unit capsule Take 400 Units by mouth daily.  glimepiride (AMARYL) 2 mg tablet Take 2 mg by mouth two (2) times a day (before meals).      carvedilol (COREG) 25 mg tablet Take 25 mg by mouth two (2) times a day.  glucose blood VI test strips (BLOOD GLUCOSE TEST) strip by Does Not Apply route. 1 box (Patient not taking: Reported on 6/30/2022) 1 Package     No current facility-administered medications for this visit.         +    Family History of CAD:    No    Social History:  Current  Smoker  No    Physical Exam:     Visit Vitals  /76 (BP 1 Location: Left upper arm, BP Patient Position: Sitting, BP Cuff Size: Adult)   Pulse 64   Ht 5' 5\" (1.651 m)   Wt 184 lb (83.5 kg)   SpO2 99%   BMI 30.62 kg/m²          Gen: Well-developed, well-nourished, in no acute distress  Neck: Supple,No JVD, No Carotid Bruit,   Resp: No accessory muscle use, Clear breath sounds, No rales or rhonchi  Card: Regular Rate,Rythm,Normal S1, S2, No murmurs, rubs or gallop. No thrills. Abd:  Soft, BS+,   MSK: No cyanosis  Skin: No rashes    Neuro: moving all four extremities , follows commands appropriately  Psych:  Good insight, oriented to person, place , alert, Nml Affect  LE: No edema    EKG:       Medication Side Effects and Warnings were discussed with patient: yes  Patient Labs were reviewed and/or requested:  yes  Patient Past Records were reviewed and/or requested: yes    Total time :        mins    ATTENTION:   This medical record was transcribed using an electronic medical records/speech recognition system. Although proofread, it may and can contain electronic, spelling and other errors. Corrections may be executed at a later time. Please feel free to contact us for any clarifications as needed.       Nafisa Gibbons MD

## 2022-06-30 NOTE — LETTER
7/2/2022    Patient: Anderson Scott   YOB: 1945   Date of Visit: 6/30/2022     Darien Duggan DO  [address] [facility] [address]  Via Fax: [phone]    Dear Darien Duggan DO,      Thank you for referring Ms. Morris Enriquez to [facility] for evaluation. My notes for this consultation are attached. If you have questions, please do not hesitate to call me. I look forward to following your patient along with you.       Sincerely,    Amaris Fernandez MD

## 2022-06-30 NOTE — PATIENT INSTRUCTIONS
High Blood Pressure: Care Instructions  Overview     It's normal for blood pressure to go up and down throughout the day. But if it stays up, you have high blood pressure. Another name for high blood pressure is hypertension. Despite what a lot of people think, high blood pressure usually doesn't cause headaches or make you feel dizzy or lightheaded. It usually has no symptoms. But it does increase your risk of stroke, heart attack, and other problems. You and your doctor will talk about your risks of these problems based on your blood pressure. Your doctor will give you a goal for your blood pressure. Your goal will be based on your health and your age. Lifestyle changes, such as eating healthy and being active, are always important to help lower blood pressure. You might also take medicine to reach your blood pressure goal.  Follow-up care is a key part of your treatment and safety. Be sure to make and go to all appointments, and call your doctor if you are having problems. It's also a good idea to know your test results and keep a list of the medicines you take. How can you care for yourself at home? Medical treatment  · If you stop taking your medicine, your blood pressure will go back up. You may take one or more types of medicine to lower your blood pressure. Be safe with medicines. Take your medicine exactly as prescribed. Call your doctor if you think you are having a problem with your medicine. · Talk to your doctor before you start taking aspirin every day. Aspirin can help certain people lower their risk of a heart attack or stroke. But taking aspirin isn't right for everyone, because it can cause serious bleeding. · See your doctor regularly. You may need to see the doctor more often at first or until your blood pressure comes down. · If you are taking blood pressure medicine, talk to your doctor before you take decongestants or anti-inflammatory medicine, such as ibuprofen.  Some of these medicines can raise blood pressure. · Learn how to check your blood pressure at home. Lifestyle changes  · Stay at a healthy weight. This is especially important if you put on weight around the waist. Losing even 10 pounds can help you lower your blood pressure. · If your doctor recommends it, get more exercise. Walking is a good choice. Bit by bit, increase the amount you walk every day. Try for at least 30 minutes on most days of the week. You also may want to swim, bike, or do other activities. · Avoid or limit alcohol. Talk to your doctor about whether you can drink any alcohol. · Try to limit how much sodium you eat to less than 2,300 milligrams (mg) a day. Your doctor may ask you to try to eat less than 1,500 mg a day. · Eat plenty of fruits (such as bananas and oranges), vegetables, legumes, whole grains, and low-fat dairy products. · Lower the amount of saturated fat in your diet. Saturated fat is found in animal products such as milk, cheese, and meat. Limiting these foods may help you lose weight and also lower your risk for heart disease. · Do not smoke. Smoking increases your risk for heart attack and stroke. If you need help quitting, talk to your doctor about stop-smoking programs and medicines. These can increase your chances of quitting for good. When should you call for help? Call 911  anytime you think you may need emergency care. This may mean having symptoms that suggest that your blood pressure is causing a serious heart or blood vessel problem. Your blood pressure may be over 180/120. For example, call 911 if:    · You have symptoms of a heart attack. These may include:  ? Chest pain or pressure, or a strange feeling in the chest.  ? Sweating. ? Shortness of breath. ? Nausea or vomiting. ? Pain, pressure, or a strange feeling in the back, neck, jaw, or upper belly or in one or both shoulders or arms. ? Lightheadedness or sudden weakness.   ? A fast or irregular heartbeat.     · You have symptoms of a stroke. These may include:  ? Sudden numbness, tingling, weakness, or loss of movement in your face, arm, or leg, especially on only one side of your body. ? Sudden vision changes. ? Sudden trouble speaking. ? Sudden confusion or trouble understanding simple statements. ? Sudden problems with walking or balance. ? A sudden, severe headache that is different from past headaches.     · You have severe back or belly pain. Do not wait until your blood pressure comes down on its own. Get help right away. Call your doctor now or seek immediate care if:    · Your blood pressure is much higher than normal (such as 180/120 or higher), but you don't have symptoms.     · You think high blood pressure is causing symptoms, such as:  ? Severe headache.  ? Blurry vision. Watch closely for changes in your health, and be sure to contact your doctor if:    · Your blood pressure measures higher than your doctor recommends at least 2 times. That means the top number is higher or the bottom number is higher, or both.     · You think you may be having side effects from your blood pressure medicine. Where can you learn more? Go to http://www.gray.com/  Enter [id number] in the search box to learn more about \"High Blood Pressure: Care Instructions. \"  Current as of: January 10, 2022               Content Version: 13.2  © 2006-2022 [employer]. Care instructions adapted under license by [employer] (which disclaims liability or warranty for this information). If you have questions about a medical condition or this instruction, always ask your healthcare professional. [place] [address] any warranty or liability for your use of this information.

## 2022-08-22 ENCOUNTER — DOCUMENTATION ONLY (OUTPATIENT)
Dept: CARDIOLOGY CLINIC | Age: 77
End: 2022-08-22

## 2022-10-20 ENCOUNTER — HOSPITAL ENCOUNTER (EMERGENCY)
Age: 77
Discharge: HOME | End: 2022-10-20
Attending: EMERGENCY MEDICINE
Payer: MEDICARE

## 2022-10-20 VITALS
DIASTOLIC BLOOD PRESSURE: 81 MMHG | OXYGEN SATURATION: 96 % | RESPIRATION RATE: 16 BRPM | HEIGHT: 65 IN | WEIGHT: 183 LBS | SYSTOLIC BLOOD PRESSURE: 150 MMHG | HEART RATE: 70 BPM | BODY MASS INDEX: 30.49 KG/M2 | TEMPERATURE: 97.9 F

## 2022-10-20 DIAGNOSIS — M79.671 RIGHT FOOT PAIN: Primary | ICD-10-CM

## 2022-10-20 PROCEDURE — 74011250637 HC RX REV CODE- 250/637: Performed by: STUDENT IN AN ORGANIZED HEALTH CARE EDUCATION/TRAINING PROGRAM

## 2022-10-20 PROCEDURE — 99283 EMERGENCY DEPT VISIT LOW MDM: CPT

## 2022-10-20 RX ORDER — TRAMADOL HYDROCHLORIDE 50 MG/1
50 TABLET ORAL
Status: COMPLETED | OUTPATIENT
Start: 2022-10-20 | End: 2022-10-20

## 2022-10-20 RX ORDER — GABAPENTIN 100 MG/1
100 CAPSULE ORAL 3 TIMES DAILY
Qty: 30 CAPSULE | Refills: 0 | Status: SHIPPED | OUTPATIENT
Start: 2022-10-20 | End: 2022-10-30

## 2022-10-20 RX ADMIN — TRAMADOL HYDROCHLORIDE 50 MG: 50 TABLET ORAL at 22:52

## 2022-10-21 NOTE — ED NOTES
The patient left the Emergency Department ambulatory, alert and oriented and in no acute distress. The patient was encouraged to call or return to the ED for worsening issues or problems and was encouraged to schedule a follow up appointment for continuing care. The patient verbalized understanding of discharge instructions and prescriptions,phaarmacy confirmed all questions were answered. The patient has no further concerns at this time.
Loss

## 2022-10-21 NOTE — ED TRIAGE NOTES
Pt ambulatory to triage w/ c/o right lower leg and ankle pain that started 3 days ago. States having known neuropathy.

## 2022-10-21 NOTE — DISCHARGE INSTRUCTIONS
Take Gabapentin as prescribed. Please follow-up with your podiatrist for re-evaluation and further management of neuropathic pain. If you develop new or worsening symptoms please return to ER.

## 2022-10-21 NOTE — ED PROVIDER NOTES
Patient is a 41-year-old female with a history of diabetes, neuropathy, hypertension, pancreatitis, hyper lipidemia who presents to ED complaining of right foot pain which started about a month ago. Patient reports pain is worse at night and states she thinks this is her neuropathy worsening. Patient reports at night the pain is aching and has been most severe the past 3 days. States that she gets up and walks around the pain improves. Patient reports she thinks it could also be related to restless leg syndrome. She denies any known injury or trauma. She also denies any calf pain, leg swelling, redness, erythema, fever, chills, chest pain, shortness of breath, difficulty breathing, numbness, weakness. States she has an appointment to be seen by her podiatrist in 4 days. Past Medical History:   Diagnosis Date    Arthritis     Diabetes ([address]     Goiter     5 total 1 resting on \"windpipe\".       HX OTHER MEDICAL     strep/ tonsillitis    Hypercholesterolemia     Hypertension     Pancreatitis 2019    SVT (supraventricular tachycardia) ([address]     ablation in 2013       Past Surgical History:   Procedure Laterality Date    COLONOSCOPY N/A 6/11/2019    COLONOSCOPY performed by Bonnie Dolan MD at [facility] ENDOSCOPY    HX HYSTERECTOMY      HX [address] SURG PROCEDURE UNLIST  2013    ablation         Family History:   Problem Relation Age of Onset    Cancer Father 61        multi myloma    Cancer Brother 52        colon/liver    Cancer Daughter 29        cancerous colon polyps       Social History     Socioeconomic History    Marital status:      Spouse name: Not on file    Number of children: Not on file    Years of education: Not on file    Highest education level: Not on file   Occupational History    Not on file   Tobacco Use    Smoking status: Former     Packs/day: 1.00     Years: 17.00     Pack years: 17.00     Types: Cigarettes     Quit date: 6/11/1979     Years since quittin.3    Smokeless tobacco: Never   Substance and Sexual Activity    Alcohol use: No    Drug use: No    Sexual activity: Not on file   Other Topics Concern    Not on file   Social History Narrative    Not on file     Social Determinants of Health     Financial Resource Strain: Not on file   Food Insecurity: Not on file   Transportation Needs: Not on file   Physical Activity: Not on file   Stress: Not on file   Social Connections: Not on file   Intimate Partner Violence: Not on file   Housing Stability: Not on file         ALLERGIES: Ace inhibitors, Hydrochlorothiazide, Morphine, Percocet [oxycodone-acetaminophen], and Vicodin [hydrocodone-acetaminophen]    Review of Systems   Respiratory:  Negative for shortness of breath. Cardiovascular:  Negative for chest pain. Musculoskeletal:  Positive for arthralgias. Negative for back pain, gait problem, joint swelling, myalgias, neck pain and neck stiffness. Skin:  Negative for color change and wound. Allergic/Immunologic: Negative for immunocompromised state. Neurological:  Negative for syncope, weakness, numbness and headaches. All other systems reviewed and are negative. Vitals:    10/20/22 2132   BP: (!) 150/81   Pulse: 70   Resp: 16   Temp: 97.9 °F (36.6 °C)   SpO2: 96%   Weight: 83 kg (183 lb)   Height: 5' 5\" (1.651 m)            Physical Exam  Vitals and nursing note reviewed. Constitutional:       General: She is not in acute distress. Appearance: Normal appearance. She is well-developed. She is not toxic-appearing. HENT:      Head: Normocephalic and atraumatic. Nose: Nose normal.      Mouth/Throat:      Mouth: Mucous membranes are moist.   Eyes:      General: Lids are normal.      Extraocular Movements: Extraocular movements intact. Conjunctiva/sclera: Conjunctivae normal.   Cardiovascular:      Rate and Rhythm: Normal rate and regular rhythm. Pulses: Normal pulses.       Heart sounds: Normal heart sounds, S1 normal and S2 normal.   Pulmonary:      Effort: Pulmonary effort is normal. No accessory muscle usage. Breath sounds: Normal breath sounds. Abdominal:      Palpations: Abdomen is soft. Musculoskeletal:         General: Normal range of motion. Cervical back: Normal range of motion and neck supple. Comments: Full ROM of all extremities. Right ankle and foot: No tenderness to palpation. Full ROM without pain. Sensation intact to dull and sharp sensation. NVI distally. DP pulses 2+ and equal bilaterally. No calf swelling or tenderness bilaterally. Ambulates without difficulty. Skin:     General: Skin is warm and dry. Capillary Refill: Capillary refill takes less than 2 seconds. Neurological:      General: No focal deficit present. Mental Status: She is alert and oriented to person, place, and time. Mental status is at baseline. Psychiatric:         Attention and Perception: Attention normal.         Mood and Affect: Mood and affect normal.         Speech: Speech normal.         Behavior: Behavior is cooperative. Thought Content: Thought content normal.         Cognition and Memory: Cognition normal.         Judgment: Judgment normal.        MDM  Number of Diagnoses or Management Options  Right foot pain  Diagnosis management comments: 77year old female with history of diabetes and neuropathy who presents with worsening right foot pain. Worse at night, improves with ambulation. NKI or trauma. No calf pain, swelling. No concern for infectious etiology. Discussed possibility of diabetic neuropathy or possibility of restless leg syndrome. Started patient on gabapentin and advised follow-up with PCP as well as podiatrist. Return to ER warnings discussed in detail. Patient and  understand agree with plan.        Amount and/or Complexity of Data Reviewed  Discuss the patient with other providers: yes (Dr. Clare Kirkpatrick, ED attending )           Procedures

## 2022-11-02 ENCOUNTER — DOCUMENTATION ONLY (OUTPATIENT)
Dept: CARDIOLOGY CLINIC | Age: 77
End: 2022-11-02

## 2022-11-02 NOTE — PROGRESS NOTES
[facility] 10/29/2021  Lipids:    Chol                 113  Triglycerides      71  HDL                   45  VLDL                  15  LDL                    53

## 2022-11-07 NOTE — PROGRESS NOTES
Jean-Pierre Arreaga MD., [facility]    [address] [place], [address] [facility]    Office [phone],FUW [phone]           Nayan Morgan is here for a f/u office visit. Primary care physician:  Bertin Vazquez DO    CC - as documented in EMR    Dear Dr. Bertin Vazquez DO    I had the pleasure of seeing Ms. Nayan Morgan in the office today. Assessment:     Nonobstructive CAD by coronary CTA 5/25/2022-asymptomatic  History of SVT-s/p ablation 2013  Hypertension  Hyperlipidemia  Diabetes mellitus-not controlled- now on pump  Needs thyroid surgery for Goiter - seen last visit for pre op - Class 2 risk - 1 risk factor (6 %)  from cardiac standpoint for surgery as per Sohail's Revised cardiac risk  Index ( 30 day risk of death, MI, cardiac arrest). ED visit - 10/2022 - R foot pain/leg pain - seen by podiatrist - scheduled to get wup - circulation test at [facility] being scheduled      Plan:        Echo-3/2022-EF normal  Blood pressure controlled. Lipids controlled per patient. On Crestor  Wearing event monitor 5/17/2022-6/17/22-no significant arrhythmias so far. PVC noted. Will finish wearing the monitor till 6/17/2022. Follow-up 6 months/earlier as needed. Aggressive cardiovascular risk for modification. Patient understands the plan. All questions were answered to the patient's satisfaction. I appreciate the opportunity to be involved in Ms. Cuca soriano. See note below for details. Please do not hesitate to contact us with questions or concerns. JeanP-ierre Arreaga MD      Cardiac Testing/ Procedures: A. Cardiac Cath/PCI:     B.ECHO/MICHELA: 3/2022       Left Ventricle: Left ventricle size is normal. Normal wall thickness (upper normal). Sigmoid septum. Normal wall motion. Normal left ventricular systolic function. EF by 2D Simpsons Biplane is 69%. Diastolic function present with increased LAP with normal LV EF.     Left Atrium: Left atrial volume index is mildly increased (35-41 mL/m2). Mitral Valve: Mild transvalvular regurgitation. Tricuspid Valve: Mild transvalvular regurgitation. RVSP is 33 mmHg. Pulmonic Valve: Mild transvalvular regurgitation        C. StressNuclear/Stress ECHO/Stress test:     D.Vascular:     E. EP: 2013 - SVT ablation     F. Miscellaneous: 5/25/22  Cor CTA  1. Left main originate normally from left coronary cusp and is normal size  without any significant atherosclerotic plaque or calcification. 2. The LAD is normal size vessel. There is noncalcified plaque with 50-60%  luminal stenosis in most views of the proximal and ostial LAD. Mid LAD  demonstrate mild calcified plaque without significant luminal stenosis. The D1  diagonal branch demonstrate ostial calcified plaque with 30-40% luminal  stenosis. The D2 diagonal branches does not demonstrate any disease or  calcification. There is no myocardial bridging seen. 3. The left circumflex is normal size vessel. There is ostial calcified plaque  with 30-40% luminal stenosis. The OM1, OM 2 and posterolateral branches does not  demonstrate any significant disease or calcification. 4. The RCA is normal size vessel and originate normally from the right coronary  cusp. There is no significant disease in the RCA and PDA branches. 5. The aortic valve does not demonstrate calcification. The mitral annulus does  not demonstrate calcification. 6. Visible portions of the ascending aorta minimal calcified plaque. Visible  portions of descending thoracic aorta minimal calcified plaque. The aortic arch  demonstrate mild calcified plaque. 7. The ascending aorta measures 30 mm. 8. The extracardiac portion of this test will be read separately by staff  radiologist.      Total coronary calcium score: 78    History:     Kt Cervantes is a 77 y.o. female who returns for follow up visit.     No CP/dyspnea/swelling LE/occasional palpitations      ( Initial visit - 5/22/22  Kt Cervantes is a 77 y.o. female who is referred establish cardiac care/prior to thyroid surgery    Patient used to be followed by Dr. Sam Medrano. She has had SVT ablation 2013. Remote history of cardiac work-up including stress test being negative per patient. Complains of fatigue. No chest pain/dyspnea. No swelling lower extremities. No palpitations. Home SBP in 120 -130s    Last HbA1c - 7.2 -     80-90s SVT; Was on medications; continued to have breakthrough episodes - Had ablation 2013 - since then no breakthrough episodes)    ROS:  (bold if positive, if negative)           Medications:       Current Outpatient Medications   Medication Sig Dispense    gabapentin (NEURONTIN) 100 mg capsule 200 mg. At night     amLODIPine (Norvasc) 5 mg tablet Take 1 Tablet by mouth daily. Indications: high blood pressure 90 Tablet    rosuvastatin (CRESTOR) 10 mg tablet Take 10 mg by mouth nightly. VITAMIN B COMPLEX PO Take 1 Tablet by mouth daily. 400 iu daily     Blood-Glucose Transmitter (Dexcom G6 Transmitter) yolie Dexcom G6 Transmitter device     spironolactone (ALDACTONE) 25 mg tablet Take 25 mg by mouth two (2) times a day. insulin lispro (HUMALOG) 100 unit/mL kwikpen by SubCUTAneous route Before breakfast, lunch, dinner and at bedtime. Pump  Indications: type 2 diabetes mellitus     aspirin delayed-release 81 mg tablet Take 1 Tab by mouth daily. 30 Tab    magnesium oxide (MAG-OX) 400 mg tablet Take 2 Tabs by mouth daily. (Patient taking differently: Take 400 mg by mouth two (2) times a day.) 60 Tab    metformin (GLUCOPHAGE) 1,000 mg tablet Take 1 Tab by mouth two (2) times daily (with meals). (Patient taking differently: Take 500 mg by mouth two (2) times daily (with meals). ) 1 Tab    cholecalciferol (VITAMIN D3) (2,000 UNITS /50 MCG) cap capsule Take 2,000 Units by mouth daily. vitamin E (AQUA GEMS) 400 unit capsule Take 400 Units by mouth daily.      glimepiride (AMARYL) 2 mg tablet Take 2 mg by mouth two (2) times a day (before meals). carvedilol (COREG) 25 mg tablet Take 25 mg by mouth two (2) times a day. No current facility-administered medications for this visit.         +    Family History of CAD:    No    Social History:  Current  Smoker  No    Physical Exam:     Visit Vitals  BP (!) 144/78 (BP 1 Location: Right arm, BP Patient Position: Sitting, BP Cuff Size: Large adult)   Ht 5' 5\" (1.651 m)   Wt 180 lb (81.6 kg)   BMI 29.95 kg/m²            Gen: Well-developed, well-nourished, in no acute distress  Neck: Supple,No JVD, No Carotid Bruit,   Resp: No accessory muscle use, Clear breath sounds, No rales or rhonchi  Card: Regular Rate,Rythm,Normal S1, S2, No murmurs, rubs or gallop. No thrills. Abd:  Soft, BS+,   MSK: No cyanosis  Skin: No rashes    Neuro: moving all four extremities , follows commands appropriately  Psych:  Good insight, oriented to person, place , alert, Nml Affect  LE: No edema    EKG:       Medication Side Effects and Warnings were discussed with patient: yes  Patient Labs were reviewed and/or requested:  yes  Patient Past Records were reviewed and/or requested: yes    Total time :        mins    ATTENTION:   This medical record was transcribed using an electronic medical records/speech recognition system. Although proofread, it may and can contain electronic, spelling and other errors. Corrections may be executed at a later time. Please feel free to contact us for any clarifications as needed.       Hi Harris MD

## 2022-11-08 ENCOUNTER — OFFICE VISIT (OUTPATIENT)
Dept: CARDIOLOGY CLINIC | Age: 77
End: 2022-11-08
Payer: MEDICARE

## 2022-11-08 VITALS
WEIGHT: 180 LBS | DIASTOLIC BLOOD PRESSURE: 78 MMHG | SYSTOLIC BLOOD PRESSURE: 144 MMHG | BODY MASS INDEX: 29.99 KG/M2 | HEIGHT: 65 IN

## 2022-11-08 DIAGNOSIS — I10 ESSENTIAL HYPERTENSION: ICD-10-CM

## 2022-11-08 DIAGNOSIS — I47.1 PSVT (PAROXYSMAL SUPRAVENTRICULAR TACHYCARDIA) (HCC): Primary | ICD-10-CM

## 2022-11-08 DIAGNOSIS — E78.5 DYSLIPIDEMIA: ICD-10-CM

## 2022-11-08 PROCEDURE — 99214 OFFICE O/P EST MOD 30 MIN: CPT | Performed by: INTERNAL MEDICINE

## 2022-11-08 PROCEDURE — 1101F PT FALLS ASSESS-DOCD LE1/YR: CPT | Performed by: INTERNAL MEDICINE

## 2022-11-08 PROCEDURE — G8427 DOCREV CUR MEDS BY ELIG CLIN: HCPCS | Performed by: INTERNAL MEDICINE

## 2022-11-08 PROCEDURE — G8400 PT W/DXA NO RESULTS DOC: HCPCS | Performed by: INTERNAL MEDICINE

## 2022-11-08 PROCEDURE — 3017F COLORECTAL CA SCREEN DOC REV: CPT | Performed by: INTERNAL MEDICINE

## 2022-11-08 PROCEDURE — G8417 CALC BMI ABV UP PARAM F/U: HCPCS | Performed by: INTERNAL MEDICINE

## 2022-11-08 PROCEDURE — 3074F SYST BP LT 130 MM HG: CPT | Performed by: INTERNAL MEDICINE

## 2022-11-08 PROCEDURE — G8754 DIAS BP LESS 90: HCPCS | Performed by: INTERNAL MEDICINE

## 2022-11-08 PROCEDURE — 1090F PRES/ABSN URINE INCON ASSESS: CPT | Performed by: INTERNAL MEDICINE

## 2022-11-08 PROCEDURE — 1123F ACP DISCUSS/DSCN MKR DOCD: CPT | Performed by: INTERNAL MEDICINE

## 2022-11-08 PROCEDURE — G8753 SYS BP > OR = 140: HCPCS | Performed by: INTERNAL MEDICINE

## 2022-11-08 PROCEDURE — G8536 NO DOC ELDER MAL SCRN: HCPCS | Performed by: INTERNAL MEDICINE

## 2022-11-08 PROCEDURE — 3078F DIAST BP <80 MM HG: CPT | Performed by: INTERNAL MEDICINE

## 2022-11-08 PROCEDURE — G8432 DEP SCR NOT DOC, RNG: HCPCS | Performed by: INTERNAL MEDICINE

## 2022-11-08 RX ORDER — GABAPENTIN 100 MG/1
200 CAPSULE ORAL
COMMUNITY
Start: 2022-11-01

## 2022-11-08 NOTE — LETTER
11/8/2022    Patient: Jenniffer Maldonado   YOB: 1945   Date of Visit: 11/8/2022     Jeramy Savage DO  [address] [facility] [address]  Via Fax: [phone]    Dear Jeramy Savage DO,      Thank you for referring Ms. Jarod Munoz to [facility] for evaluation. My notes for this consultation are attached. If you have questions, please do not hesitate to call me. I look forward to following your patient along with you.       Sincerely,    Vivian Aguilar MD

## 2022-11-08 NOTE — PROGRESS NOTES
Damaris Kuhn is a 77 y.o. female    Chief Complaint   Patient presents with    Follow-up     PSVT    Hypertension    Cholesterol Problem       Vitals:    11/08/22 1118 11/08/22 1129   BP: (!) 150/78 (!) 144/78   BP 1 Location: Left upper arm Right arm   BP Patient Position: Sitting Sitting   BP Cuff Size: Small adult Large adult   Height: 5' 5\" (1.651 m)    Weight: 180 lb (81.6 kg)          Chest pain no    SOB no    Dizziness no    Swelling in her ankles    Refills no      1. Have you been to the ER, urgent care clinic since your last visit? Hospitalized since your last visit? ED 10/20    2. Have you seen or consulted any other health care providers outside of the [address] [place] [address] since your last visit? Include any pap smears or colon screening.  No

## 2022-11-16 ENCOUNTER — TRANSCRIBE ORDER (OUTPATIENT)
Dept: SCHEDULING | Age: 77
End: 2022-11-16

## 2022-11-16 DIAGNOSIS — M79.671 RIGHT FOOT PAIN: Primary | ICD-10-CM

## 2022-11-22 ENCOUNTER — HOSPITAL ENCOUNTER (OUTPATIENT)
Dept: VASCULAR SURGERY | Age: 77
Discharge: HOME | End: 2022-11-22
Attending: PODIATRIST
Payer: MEDICARE

## 2022-11-22 DIAGNOSIS — M79.671 RIGHT FOOT PAIN: ICD-10-CM

## 2022-11-22 PROCEDURE — 93923 UPR/LXTR ART STDY 3+ LVLS: CPT

## 2022-11-22 PROCEDURE — 93922 UPR/L XTREMITY ART 2 LEVELS: CPT

## 2022-11-23 LAB
LEFT ABI: 1.08
LEFT ARM BP: 159 MMHG
LEFT CALF PRESSURE: 193 MMHG
LEFT LOW THIGH PRESSURE: 199 MMHG
LEFT POSTERIOR TIBIAL: 171 MMHG
LEFT TBI: 0.69
LEFT TOE PRESSURE: 109 MMHG
RIGHT ABI: 0.84
RIGHT ARM BP: 148 MMHG
RIGHT CALF PRESSURE: 170 MMHG
RIGHT LOW THIGH PRESSURE: 204 MMHG
RIGHT POSTERIOR TIBIAL: 119 MMHG
RIGHT TBI: 0.89
RIGHT TOE PRESSURE: 142 MMHG
VAS LEFT DORSALIS PEDIS BP: 172 MMHG
VAS RIGHT DORSALIS PEDIS BP: 134 MMHG

## 2023-05-27 ENCOUNTER — HOSPITAL ENCOUNTER (EMERGENCY)
Facility: HOSPITAL | Age: 78
Discharge: HOME | End: 2023-05-27
Attending: STUDENT IN AN ORGANIZED HEALTH CARE EDUCATION/TRAINING PROGRAM
Payer: MEDICARE

## 2023-05-27 ENCOUNTER — APPOINTMENT (OUTPATIENT)
Facility: HOSPITAL | Age: 78
End: 2023-05-27
Payer: MEDICARE

## 2023-05-27 VITALS
TEMPERATURE: 98.6 F | HEIGHT: 65 IN | WEIGHT: 183 LBS | OXYGEN SATURATION: 98 % | HEART RATE: 69 BPM | DIASTOLIC BLOOD PRESSURE: 82 MMHG | BODY MASS INDEX: 30.49 KG/M2 | RESPIRATION RATE: 18 BRPM | SYSTOLIC BLOOD PRESSURE: 170 MMHG

## 2023-05-27 DIAGNOSIS — B34.9 VIRAL ILLNESS: ICD-10-CM

## 2023-05-27 DIAGNOSIS — R06.02 SHORTNESS OF BREATH: Primary | ICD-10-CM

## 2023-05-27 LAB
ALBUMIN SERPL-MCNC: 3.7 G/DL (ref 3.5–5)
ALBUMIN/GLOB SERPL: 0.9 (ref 1.1–2.2)
ALP SERPL-CCNC: 87 U/L (ref 45–117)
ALT SERPL-CCNC: 44 U/L (ref 12–78)
ANION GAP SERPL CALC-SCNC: 6 MMOL/L (ref 5–15)
AST SERPL-CCNC: 28 U/L (ref 15–37)
BASOPHILS # BLD: 0 K/UL (ref 0–0.1)
BASOPHILS NFR BLD: 0 % (ref 0–1)
BILIRUB SERPL-MCNC: 0.4 MG/DL (ref 0.2–1)
BUN SERPL-MCNC: 23 MG/DL (ref 6–20)
BUN/CREAT SERPL: 20 (ref 12–20)
CALCIUM SERPL-MCNC: 9 MG/DL (ref 8.5–10.1)
CHLORIDE SERPL-SCNC: 106 MMOL/L (ref 97–108)
CO2 SERPL-SCNC: 28 MMOL/L (ref 21–32)
COMMENT:: NORMAL
CREAT SERPL-MCNC: 1.13 MG/DL (ref 0.55–1.02)
D DIMER PPP FEU-MCNC: 0.99 MG/L FEU (ref 0–0.65)
DIFFERENTIAL METHOD BLD: NORMAL
EOSINOPHIL # BLD: 0.2 K/UL (ref 0–0.4)
EOSINOPHIL NFR BLD: 2 % (ref 0–7)
ERYTHROCYTE [DISTWIDTH] IN BLOOD BY AUTOMATED COUNT: 13.3 % (ref 11.5–14.5)
GLOBULIN SER CALC-MCNC: 4 G/DL (ref 2–4)
GLUCOSE SERPL-MCNC: 135 MG/DL (ref 65–100)
HCT VFR BLD AUTO: 38.6 % (ref 35–47)
HGB BLD-MCNC: 12.8 G/DL (ref 11.5–16)
IMM GRANULOCYTES # BLD AUTO: 0 K/UL (ref 0–0.04)
IMM GRANULOCYTES NFR BLD AUTO: 0 % (ref 0–0.5)
LIPASE SERPL-CCNC: 251 U/L (ref 73–393)
LYMPHOCYTES # BLD: 2.2 K/UL (ref 0.8–3.5)
LYMPHOCYTES NFR BLD: 24 % (ref 12–49)
MAGNESIUM SERPL-MCNC: 2 MG/DL (ref 1.6–2.4)
MCH RBC QN AUTO: 30.5 PG (ref 26–34)
MCHC RBC AUTO-ENTMCNC: 33.2 G/DL (ref 30–36.5)
MCV RBC AUTO: 92.1 FL (ref 80–99)
MONOCYTES # BLD: 0.8 K/UL (ref 0–1)
MONOCYTES NFR BLD: 9 % (ref 5–13)
NEUTS SEG # BLD: 5.7 K/UL (ref 1.8–8)
NEUTS SEG NFR BLD: 65 % (ref 32–75)
NRBC # BLD: 0 K/UL (ref 0–0.01)
NRBC BLD-RTO: 0 PER 100 WBC
NT PRO BNP: 43 PG/ML
PLATELET # BLD AUTO: 236 K/UL (ref 150–400)
PMV BLD AUTO: 9.6 FL (ref 8.9–12.9)
POTASSIUM SERPL-SCNC: 4.2 MMOL/L (ref 3.5–5.1)
PROT SERPL-MCNC: 7.7 G/DL (ref 6.4–8.2)
RBC # BLD AUTO: 4.19 M/UL (ref 3.8–5.2)
SODIUM SERPL-SCNC: 140 MMOL/L (ref 136–145)
SPECIMEN HOLD: NORMAL
TROPONIN I SERPL HS-MCNC: 10 NG/L (ref 0–51)
TSH SERPL DL<=0.05 MIU/L-ACNC: 0.39 UIU/ML (ref 0.36–3.74)
WBC # BLD AUTO: 8.8 K/UL (ref 3.6–11)

## 2023-05-27 PROCEDURE — 80053 COMPREHEN METABOLIC PANEL: CPT

## 2023-05-27 PROCEDURE — 83690 ASSAY OF LIPASE: CPT

## 2023-05-27 PROCEDURE — 83880 ASSAY OF NATRIURETIC PEPTIDE: CPT

## 2023-05-27 PROCEDURE — 71046 X-RAY EXAM CHEST 2 VIEWS: CPT

## 2023-05-27 PROCEDURE — 83735 ASSAY OF MAGNESIUM: CPT

## 2023-05-27 PROCEDURE — 85025 COMPLETE CBC W/AUTO DIFF WBC: CPT

## 2023-05-27 PROCEDURE — 85379 FIBRIN DEGRADATION QUANT: CPT

## 2023-05-27 PROCEDURE — 36415 COLL VENOUS BLD VENIPUNCTURE: CPT

## 2023-05-27 PROCEDURE — 99285 EMERGENCY DEPT VISIT HI MDM: CPT

## 2023-05-27 PROCEDURE — 84484 ASSAY OF TROPONIN QUANT: CPT

## 2023-05-27 PROCEDURE — 6360000004 HC RX CONTRAST MEDICATION: Performed by: INTERNAL MEDICINE

## 2023-05-27 PROCEDURE — 71275 CT ANGIOGRAPHY CHEST: CPT

## 2023-05-27 PROCEDURE — 84443 ASSAY THYROID STIM HORMONE: CPT

## 2023-05-27 RX ADMIN — IOPAMIDOL 71 ML: 755 INJECTION, SOLUTION INTRAVENOUS at 21:49

## 2023-05-27 ASSESSMENT — PAIN SCALES - GENERAL: PAINLEVEL_OUTOF10: 0

## 2023-05-27 ASSESSMENT — PAIN - FUNCTIONAL ASSESSMENT: PAIN_FUNCTIONAL_ASSESSMENT: 0-10

## 2023-05-27 NOTE — ED PROVIDER NOTES
EMERGENCY DEPARTMENT PHYSICIAN NOTE     Patient: Javier Aaron     Time of Service: No admission date for patient encounter. Chief complaint:   Chief Complaint   Patient presents with    Shortness of Breath        HISTORY:  Patient is a 69 y.o. female who presents to the emergency department with complaints of Shortness of breath, pleuritic chest pain  but no cough for 5 days. No fevers. Patient had COVID back in 120s to feel somewhat. However urgent care patient had EKG and COVID swab. EKG can be found in media tab, mild bradycardia. Past Medical History:   Diagnosis Date    Arthritis     Diabetes ([address]     Goiter     5 total 1 resting on \"windpipe\". Hypercholesterolemia     Hypertension     Pancreatitis     SVT (supraventricular tachycardia) ([address]     ablation in         Past Surgical History:   Procedure Laterality Date    COLONOSCOPY N/A 2019    COLONOSCOPY performed by Michael Sidhu MD at [facility] (CERVIX STATUS UNKNOWN)      [address] [facility]      ablation    TUBAL LIGATION          Family History   Problem Relation Age of Onset    Cancer Brother 52        colon/liver    Cancer Father 61        multi myloma    Cancer Daughter 29        cancerous colon polyps        Social History     Socioeconomic History    Marital status:    Tobacco Use    Smoking status: Former     Packs/day: 1.00     Types: Cigarettes     Quit date: 1979     Years since quittin.9    Smokeless tobacco: Never   Substance and Sexual Activity    Alcohol use: No    Drug use: No        Current Medications: Reviewed in chart. Allergies:    Allergies   Allergen Reactions    Ace Inhibitors Angioedema    Hydrochlorothiazide Other (See Comments)     Hydrochlorothiazide/Triamterene - acute pancreatitis    Hydrocodone-Acetaminophen Swelling    Morphine Rash    Oxycodone-Acetaminophen Rash          REVIEW OF SYSTEMS: See HPI for pertinent positives

## 2023-05-27 NOTE — ED TRIAGE NOTES
Pt ambulatory to Triage with c/o shortness of breath. Pt states that she went to the [facility] because she felt like she was having symptoms of COVID. Pt reports increased fatigue and trouble with breathing.

## 2023-06-26 ENCOUNTER — OFFICE VISIT (OUTPATIENT)
Age: 78
End: 2023-06-26
Payer: MEDICARE

## 2023-06-26 VITALS
HEIGHT: 65 IN | WEIGHT: 188 LBS | BODY MASS INDEX: 31.32 KG/M2 | HEART RATE: 62 BPM | SYSTOLIC BLOOD PRESSURE: 120 MMHG | RESPIRATION RATE: 16 BRPM | OXYGEN SATURATION: 96 % | DIASTOLIC BLOOD PRESSURE: 78 MMHG

## 2023-06-26 DIAGNOSIS — I10 ESSENTIAL (PRIMARY) HYPERTENSION: ICD-10-CM

## 2023-06-26 DIAGNOSIS — I47.1 PSVT (PAROXYSMAL SUPRAVENTRICULAR TACHYCARDIA) (HCC): Primary | ICD-10-CM

## 2023-06-26 DIAGNOSIS — E78.2 MIXED HYPERLIPIDEMIA: ICD-10-CM

## 2023-06-26 PROCEDURE — 99214 OFFICE O/P EST MOD 30 MIN: CPT | Performed by: INTERNAL MEDICINE

## 2023-06-26 PROCEDURE — 3074F SYST BP LT 130 MM HG: CPT | Performed by: INTERNAL MEDICINE

## 2023-06-26 PROCEDURE — 3078F DIAST BP <80 MM HG: CPT | Performed by: INTERNAL MEDICINE

## 2023-06-26 PROCEDURE — 1123F ACP DISCUSS/DSCN MKR DOCD: CPT | Performed by: INTERNAL MEDICINE

## 2023-06-26 RX ORDER — PROCHLORPERAZINE 25 MG/1
SUPPOSITORY RECTAL
COMMUNITY
Start: 2023-05-07

## 2023-06-26 RX ORDER — PROCHLORPERAZINE 25 MG/1
SUPPOSITORY RECTAL
COMMUNITY
Start: 2023-06-19

## 2023-06-26 ASSESSMENT — PATIENT HEALTH QUESTIONNAIRE - PHQ9
SUM OF ALL RESPONSES TO PHQ9 QUESTIONS 1 & 2: 0
2. FEELING DOWN, DEPRESSED OR HOPELESS: 0
SUM OF ALL RESPONSES TO PHQ QUESTIONS 1-9: 0
1. LITTLE INTEREST OR PLEASURE IN DOING THINGS: 0
SUM OF ALL RESPONSES TO PHQ QUESTIONS 1-9: 0

## 2023-08-21 RX ORDER — AMLODIPINE BESYLATE 5 MG/1
TABLET ORAL
Qty: 90 TABLET | Refills: 3 | Status: SHIPPED | OUTPATIENT
Start: 2023-08-21

## 2023-08-21 NOTE — TELEPHONE ENCOUNTER
Requested Prescriptions     Signed Prescriptions Disp Refills    amLODIPine (NORVASC) 5 MG tablet 90 tablet 3     Sig: TAKE ONE TABLET BY MOUTH DAILY FOR HIGH BLOOD PRESSURE     Authorizing Provider: Kailyn Negro     Ordering User: Lionel Mason

## 2023-08-23 ENCOUNTER — HOSPITAL ENCOUNTER (EMERGENCY)
Facility: HOSPITAL | Age: 78
Discharge: HOME | End: 2023-08-23
Attending: EMERGENCY MEDICINE
Payer: MEDICARE

## 2023-08-23 ENCOUNTER — APPOINTMENT (OUTPATIENT)
Facility: HOSPITAL | Age: 78
End: 2023-08-23
Payer: MEDICARE

## 2023-08-23 VITALS
WEIGHT: 185 LBS | HEIGHT: 65 IN | OXYGEN SATURATION: 98 % | RESPIRATION RATE: 17 BRPM | HEART RATE: 76 BPM | TEMPERATURE: 98.4 F | DIASTOLIC BLOOD PRESSURE: 70 MMHG | SYSTOLIC BLOOD PRESSURE: 160 MMHG | BODY MASS INDEX: 30.82 KG/M2

## 2023-08-23 DIAGNOSIS — R31.9 HEMATURIA, UNSPECIFIED TYPE: ICD-10-CM

## 2023-08-23 DIAGNOSIS — R10.9 FLANK PAIN: Primary | ICD-10-CM

## 2023-08-23 LAB
ALBUMIN SERPL-MCNC: 4.3 G/DL (ref 3.5–5)
ALBUMIN/GLOB SERPL: 0.9 (ref 1.1–2.2)
ALP SERPL-CCNC: 98 U/L (ref 45–117)
ALT SERPL-CCNC: 40 U/L (ref 12–78)
ANION GAP SERPL CALC-SCNC: 10 MMOL/L (ref 5–15)
APPEARANCE UR: ABNORMAL
AST SERPL-CCNC: 26 U/L (ref 15–37)
BACTERIA URNS QL MICRO: ABNORMAL /HPF
BASOPHILS # BLD: 0 K/UL (ref 0–0.1)
BASOPHILS NFR BLD: 0 % (ref 0–1)
BILIRUB SERPL-MCNC: 0.7 MG/DL (ref 0.2–1)
BILIRUB UR QL: NEGATIVE
BUN SERPL-MCNC: 18 MG/DL (ref 6–20)
BUN/CREAT SERPL: 19 (ref 12–20)
CALCIUM SERPL-MCNC: 9.7 MG/DL (ref 8.5–10.1)
CHLORIDE SERPL-SCNC: 98 MMOL/L (ref 97–108)
CO2 SERPL-SCNC: 28 MMOL/L (ref 21–32)
COLOR UR: ABNORMAL
CREAT SERPL-MCNC: 0.95 MG/DL (ref 0.55–1.02)
DIFFERENTIAL METHOD BLD: ABNORMAL
EOSINOPHIL # BLD: 0 K/UL (ref 0–0.4)
EOSINOPHIL NFR BLD: 0 % (ref 0–7)
EPITH CASTS URNS QL MICRO: ABNORMAL /LPF
ERYTHROCYTE [DISTWIDTH] IN BLOOD BY AUTOMATED COUNT: 12.7 % (ref 11.5–14.5)
GLOBULIN SER CALC-MCNC: 4.6 G/DL (ref 2–4)
GLUCOSE SERPL-MCNC: 166 MG/DL (ref 65–100)
GLUCOSE UR STRIP.AUTO-MCNC: NEGATIVE MG/DL
HCT VFR BLD AUTO: 43 % (ref 35–47)
HGB BLD-MCNC: 14.4 G/DL (ref 11.5–16)
HGB UR QL STRIP: ABNORMAL
IMM GRANULOCYTES # BLD AUTO: 0.1 K/UL (ref 0–0.04)
IMM GRANULOCYTES NFR BLD AUTO: 0 % (ref 0–0.5)
KETONES UR QL STRIP.AUTO: NEGATIVE MG/DL
LEUKOCYTE ESTERASE UR QL STRIP.AUTO: NEGATIVE
LYMPHOCYTES # BLD: 1.1 K/UL (ref 0.8–3.5)
LYMPHOCYTES NFR BLD: 9 % (ref 12–49)
MCH RBC QN AUTO: 30.6 PG (ref 26–34)
MCHC RBC AUTO-ENTMCNC: 33.5 G/DL (ref 30–36.5)
MCV RBC AUTO: 91.3 FL (ref 80–99)
MONOCYTES # BLD: 0.6 K/UL (ref 0–1)
MONOCYTES NFR BLD: 4 % (ref 5–13)
NEUTS SEG # BLD: 10.9 K/UL (ref 1.8–8)
NEUTS SEG NFR BLD: 87 % (ref 32–75)
NITRITE UR QL STRIP.AUTO: NEGATIVE
NRBC # BLD: 0 K/UL (ref 0–0.01)
NRBC BLD-RTO: 0 PER 100 WBC
PH UR STRIP: 6 (ref 5–8)
PLATELET # BLD AUTO: 243 K/UL (ref 150–400)
PMV BLD AUTO: 9.2 FL (ref 8.9–12.9)
POTASSIUM SERPL-SCNC: 4.2 MMOL/L (ref 3.5–5.1)
PROT SERPL-MCNC: 8.9 G/DL (ref 6.4–8.2)
PROT UR STRIP-MCNC: 100 MG/DL
RBC # BLD AUTO: 4.71 M/UL (ref 3.8–5.2)
RBC #/AREA URNS HPF: ABNORMAL /HPF (ref 0–5)
SODIUM SERPL-SCNC: 136 MMOL/L (ref 136–145)
SP GR UR REFRACTOMETRY: 1.02 (ref 1–1.03)
UROBILINOGEN UR QL STRIP.AUTO: 0.2 EU/DL (ref 0.2–1)
WBC # BLD AUTO: 12.6 K/UL (ref 3.6–11)
WBC URNS QL MICRO: ABNORMAL /HPF (ref 0–4)

## 2023-08-23 PROCEDURE — 80053 COMPREHEN METABOLIC PANEL: CPT

## 2023-08-23 PROCEDURE — 36415 COLL VENOUS BLD VENIPUNCTURE: CPT

## 2023-08-23 PROCEDURE — 85025 COMPLETE CBC W/AUTO DIFF WBC: CPT

## 2023-08-23 PROCEDURE — 81001 URINALYSIS AUTO W/SCOPE: CPT

## 2023-08-23 PROCEDURE — 96375 TX/PRO/DX INJ NEW DRUG ADDON: CPT

## 2023-08-23 PROCEDURE — 74176 CT ABD & PELVIS W/O CONTRAST: CPT

## 2023-08-23 PROCEDURE — 76770 US EXAM ABDO BACK WALL COMP: CPT

## 2023-08-23 PROCEDURE — 6360000002 HC RX W HCPCS: Performed by: EMERGENCY MEDICINE

## 2023-08-23 PROCEDURE — 96374 THER/PROPH/DIAG INJ IV PUSH: CPT

## 2023-08-23 PROCEDURE — 99284 EMERGENCY DEPT VISIT MOD MDM: CPT

## 2023-08-23 RX ORDER — ONDANSETRON 2 MG/ML
4 INJECTION INTRAMUSCULAR; INTRAVENOUS ONCE
Status: COMPLETED | OUTPATIENT
Start: 2023-08-23 | End: 2023-08-23

## 2023-08-23 RX ORDER — KETOROLAC TROMETHAMINE 30 MG/ML
15 INJECTION, SOLUTION INTRAMUSCULAR; INTRAVENOUS ONCE
Status: COMPLETED | OUTPATIENT
Start: 2023-08-23 | End: 2023-08-23

## 2023-08-23 RX ADMIN — ONDANSETRON 4 MG: 2 INJECTION INTRAMUSCULAR; INTRAVENOUS at 14:49

## 2023-08-23 RX ADMIN — KETOROLAC TROMETHAMINE 15 MG: 30 INJECTION, SOLUTION INTRAMUSCULAR; INTRAVENOUS at 14:48

## 2023-08-23 ASSESSMENT — PAIN DESCRIPTION - LOCATION
LOCATION: BACK

## 2023-08-23 ASSESSMENT — PAIN DESCRIPTION - ORIENTATION
ORIENTATION: LOWER
ORIENTATION: LEFT

## 2023-08-23 ASSESSMENT — PAIN SCALES - GENERAL
PAINLEVEL_OUTOF10: 4
PAINLEVEL_OUTOF10: 1
PAINLEVEL_OUTOF10: 4
PAINLEVEL_OUTOF10: 1

## 2023-08-23 ASSESSMENT — PAIN DESCRIPTION - DESCRIPTORS
DESCRIPTORS: TENDER
DESCRIPTORS: DULL

## 2023-08-23 NOTE — ED PROVIDER NOTES
[facility] EMERGENCY DEPT  EMERGENCY DEPARTMENT ENCOUNTER      Pt Name: Sherrilyn Severin  MRN: [id number]  [address] [facility] 1945  Date of evaluation: 8/23/2023  Provider: Verona Dillon MD    CHIEF COMPLAINT     No chief complaint on file. HISTORY OF PRESENT ILLNESS   (Location/Symptom, Timing/Onset, Context/Setting, Quality, Duration, Modifying Factors, Severity)  Note limiting factors. 71-year-old with a history of hypertension, diabetes, hyperlipidemia, arthritis, pancreatitis, SVT status post ablation. She presents accompanied by her  with complaints of left flank pain. It began last night. It has been constant and steady. She has had nausea and dry heaves. She denies urinary symptoms. She states that she felt the urge to move her bowels overnight last night but was unable. Her last bowel movement was yesterday morning. She was referred by an urgent care center -she reports that they discovered hematuria. Review of External Medical Records:     Nursing Notes were reviewed. REVIEW OF SYSTEMS    (2-9 systems for level 4, 10 or more for level 5)     Review of Systems    Except as noted above the remainder of the review of systems was reviewed and negative. PAST MEDICAL HISTORY     Past Medical History:   Diagnosis Date    Arthritis     Diabetes ([address])     Goiter     5 total 1 resting on \"windpipe\".       Hypercholesterolemia     Hypertension     Pancreatitis 2019    SVT (supraventricular tachycardia) ([address])     ablation in 2013         SURGICAL HISTORY       Past Surgical History:   Procedure Laterality Date    COLONOSCOPY N/A 6/11/2019    COLONOSCOPY performed by Bita Barillas MD at [address] [place] [address] ([address])      UT UNLISTED PROCEDURE CARDIAC SURGERY  2013    ablation    TUBAL LIGATION           CURRENT MEDICATIONS       Previous Medications    AMLODIPINE (NORVASC) 5 MG TABLET    TAKE ONE TABLET BY MOUTH DAILY FOR HIGH BLOOD PRESSURE    ASPIRIN

## 2023-08-23 NOTE — ED NOTES
The patient was discharged home by provider in stable condition. The patient is alert and oriented, in no respiratory distress and discharge vital signs obtained. The patient's diagnosis, condition and treatment were explained. The patient expressed understanding and denies any questions or concerns at this time. Patient leaves treatment area ambulatory with all personal belongings.       Meka Cortez RN  08/23/23 4932

## 2023-08-23 NOTE — ED TRIAGE NOTES
Cc of left lower back pain and constipation. Last BM was yesterday and \"normal\" per patient. Denies and blood in stool. Also reports hematuria w/o dysuria or frequency. Pt denies abdominal or flank pain.
yes

## 2023-09-20 ENCOUNTER — TRANSCRIBE ORDERS (OUTPATIENT)
Facility: HOSPITAL | Age: 78
End: 2023-09-20

## 2023-09-20 ENCOUNTER — TELEPHONE (OUTPATIENT)
Age: 78
End: 2023-09-20

## 2023-09-20 DIAGNOSIS — N28.9 RENAL LESION: Primary | ICD-10-CM

## 2023-09-20 NOTE — TELEPHONE ENCOUNTER
Sujata allison/[facility] called for ekg for urgent upcoming procedure on tues 26th, she's asking if pt can go off of her aspirin? ?         Sujata # [phone]

## 2023-09-22 ENCOUNTER — TELEPHONE (OUTPATIENT)
Age: 78
End: 2023-09-22

## 2023-09-22 NOTE — TELEPHONE ENCOUNTER
Spoke with [facility]. Form was sent yesterday. Transmission confirmed.   Sent again today to a different fax number per their request.

## 2023-09-22 NOTE — TELEPHONE ENCOUNTER
[facility] is calling to have the patient hold her aspirin this weekend because she has surgery schedule for 9/26/23. There was a form faxed over that needs to be faxed back for clearance.     Barbara Pretty [phone] office  [phone] fax

## 2023-10-02 ENCOUNTER — HOSPITAL ENCOUNTER (OUTPATIENT)
Facility: HOSPITAL | Age: 78
Discharge: HOME | End: 2023-10-05
Attending: UROLOGY
Payer: MEDICARE

## 2023-10-02 VITALS — BODY MASS INDEX: 30.79 KG/M2 | WEIGHT: 185 LBS

## 2023-10-02 DIAGNOSIS — N28.9 RENAL LESION: ICD-10-CM

## 2023-10-02 PROCEDURE — A9579 GAD-BASE MR CONTRAST NOS,1ML: HCPCS | Performed by: RADIOLOGY

## 2023-10-02 PROCEDURE — 6360000004 HC RX CONTRAST MEDICATION: Performed by: RADIOLOGY

## 2023-10-02 PROCEDURE — 74183 MRI ABD W/O CNTR FLWD CNTR: CPT

## 2023-10-02 RX ADMIN — GADOTERIDOL 16 ML: 279.3 INJECTION, SOLUTION INTRAVENOUS at 14:40

## 2024-01-12 ENCOUNTER — TRANSCRIBE ORDERS (OUTPATIENT)
Facility: HOSPITAL | Age: 79
End: 2024-01-12

## 2024-01-12 DIAGNOSIS — N28.9 RENAL LESION: Primary | ICD-10-CM

## 2024-03-20 ENCOUNTER — TELEPHONE (OUTPATIENT)
Age: 79
End: 2024-03-20

## 2024-03-20 DIAGNOSIS — I47.10 PSVT (PAROXYSMAL SUPRAVENTRICULAR TACHYCARDIA): ICD-10-CM

## 2024-03-20 DIAGNOSIS — I47.10 SUPRAVENTRICULAR TACHYCARDIA: ICD-10-CM

## 2024-03-20 DIAGNOSIS — I10 ESSENTIAL (PRIMARY) HYPERTENSION: Primary | ICD-10-CM

## 2024-03-20 RX ORDER — CARVEDILOL 25 MG/1
25 TABLET ORAL 2 TIMES DAILY
Qty: 180 TABLET | Refills: 3 | Status: SHIPPED | OUTPATIENT
Start: 2024-03-20

## 2024-03-20 RX ORDER — CARVEDILOL 25 MG/1
25 TABLET ORAL 2 TIMES DAILY
Qty: 180 TABLET | OUTPATIENT
Start: 2024-03-20

## 2024-03-20 NOTE — TELEPHONE ENCOUNTER
Patient is calling because she needs a refill on medication Carvedilol 25 mg. Patient stated she is almost out.     Patient phone # [phone]    [facility] Phone # [phone].

## 2024-03-20 NOTE — TELEPHONE ENCOUNTER
Requested Prescriptions     Signed Prescriptions Disp Refills    carvedilol (COREG) 25 MG tablet 180 tablet 3     Sig: Take 1 tablet by mouth 2 times daily     Authorizing Provider: RONALD CHRISTIE     Ordering User: SHYANN NI

## 2024-06-06 ENCOUNTER — HOSPITAL ENCOUNTER (OUTPATIENT)
Facility: HOSPITAL | Age: 79
Discharge: HOME | End: 2024-06-06
Attending: UROLOGY
Payer: MEDICARE

## 2024-06-06 VITALS — WEIGHT: 189 LBS | BODY MASS INDEX: 31.45 KG/M2

## 2024-06-06 DIAGNOSIS — N28.9 RENAL LESION: ICD-10-CM

## 2024-06-06 PROCEDURE — 6360000004 HC RX CONTRAST MEDICATION: Performed by: RADIOLOGY

## 2024-06-06 PROCEDURE — A9579 GAD-BASE MR CONTRAST NOS,1ML: HCPCS | Performed by: RADIOLOGY

## 2024-06-06 PROCEDURE — 74183 MRI ABD W/O CNTR FLWD CNTR: CPT

## 2024-06-06 RX ADMIN — GADOTERIDOL 17 ML: 279.3 INJECTION, SOLUTION INTRAVENOUS at 14:04

## 2024-06-28 ENCOUNTER — OFFICE VISIT (OUTPATIENT)
Age: 79
End: 2024-06-28
Payer: MEDICARE

## 2024-06-28 VITALS
HEART RATE: 68 BPM | SYSTOLIC BLOOD PRESSURE: 134 MMHG | RESPIRATION RATE: 18 BRPM | DIASTOLIC BLOOD PRESSURE: 70 MMHG | OXYGEN SATURATION: 98 % | WEIGHT: 189 LBS | HEIGHT: 65 IN | BODY MASS INDEX: 31.49 KG/M2

## 2024-06-28 DIAGNOSIS — I47.10 PSVT (PAROXYSMAL SUPRAVENTRICULAR TACHYCARDIA) (HCC): ICD-10-CM

## 2024-06-28 DIAGNOSIS — I47.10 SUPRAVENTRICULAR TACHYCARDIA (HCC): ICD-10-CM

## 2024-06-28 DIAGNOSIS — I10 ESSENTIAL (PRIMARY) HYPERTENSION: Primary | ICD-10-CM

## 2024-06-28 PROCEDURE — 93005 ELECTROCARDIOGRAM TRACING: CPT | Performed by: INTERNAL MEDICINE

## 2024-06-28 PROCEDURE — 99214 OFFICE O/P EST MOD 30 MIN: CPT | Performed by: INTERNAL MEDICINE

## 2024-06-28 NOTE — PROGRESS NOTES
Johann Ceron MD., [facility]      [address],[facility],[place] [address]      Office [phone],Fax [phone]                 Mady Rosales is here for a f/u office visit.      Primary care physician:   Abby Medina DO      CC - as documented in EMR      Dear Abby Patterson DO      I had the pleasure of seeing Ms.Rebecca NEGRA Rosales in the office today.             Assessment:        Nonobstructive CAD by coronary CTA 5/25/2022-asymptomatic   History of SVT-s/p ablation 2013   Hypertension   Hyperlipidemia   Diabetes mellitus-                  Plan:            Echo-3/2022-EF normal   Blood pressure controlled.   Lipids controlled per patient. On Crestor   Wearing event monitor 5/17/2022-6/17/22-no significant arrhythmias so far.  PVC noted.    HbA1c 7 June 20204 . Lipids checked per pt - OK   Follow-up 6 months/earlier as needed.   Aggressive cardiovascular risk for modification.      Patient understands the plan. All questions were answered to the patient's satisfaction.      I appreciate the opportunity to be involved in  care. See note below for details. Please do not hesitate to contact us with questions  or concerns.      Johann Ceron MD             Cardiac Testing/ Procedures:           A.Cardiac Cath/PCI:       B.ECHO/ZACH: 3/2022         Left Ventricle: Left ventricle size is normal. Normal wall thickness (upper normal). Sigmoid septum. Normal wall motion. Normal left ventricular  systolic function. EF by 2D Simpsons Biplane is 69%. Diastolic function present with increased LAP with normal LV EF.     Left Atrium: Left atrial volume index is mildly increased (35-41 mL/m2).     Mitral Valve: Mild transvalvular regurgitation.     Tricuspid Valve: Mild transvalvular regurgitation. RVSP is 33 mmHg.     Pulmonic Valve: Mild transvalvular regurgitation           C.StressNuclear/Stress ECHO/Stress test:       D.Vascular:       E. EP:

## 2024-06-28 NOTE — PROGRESS NOTES
Chief Complaint   Patient presents with    Coronary Artery Disease    Hypertension    Hyperlipidemia    Follow-up    History of SVT         Chest Pain  No        Last Lipids No results found for: \"CHOL\", \"TRIG\", \"HDL\", \"VLDL\", \"CHOLHDLRATIO\"     Refills    /70 (Site: Left Upper Arm, Position: Sitting)   Pulse 68   Resp 18   Ht 1.651 m (5' 5\")   Wt 85.7 kg (189 lb)   SpO2 98%   BMI 31.45 kg/m²       Have you been to the ER, urgent care clinic since your last visit? No  Hospitalized since your last visit? NO    2. Have you seen or consulted with any other health care providers outside of the [facility] since your last visit?  [facility]

## 2024-08-23 RX ORDER — AMLODIPINE BESYLATE 5 MG/1
5 TABLET ORAL DAILY
Qty: 90 TABLET | Refills: 1 | Status: SHIPPED | OUTPATIENT
Start: 2024-08-23

## 2025-01-16 ENCOUNTER — OFFICE VISIT (OUTPATIENT)
Age: 80
End: 2025-01-16
Payer: MEDICARE

## 2025-01-16 VITALS
OXYGEN SATURATION: 94 % | WEIGHT: 191.4 LBS | HEIGHT: 65 IN | HEART RATE: 76 BPM | RESPIRATION RATE: 18 BRPM | DIASTOLIC BLOOD PRESSURE: 72 MMHG | BODY MASS INDEX: 31.89 KG/M2 | SYSTOLIC BLOOD PRESSURE: 134 MMHG

## 2025-01-16 DIAGNOSIS — E78.2 MIXED HYPERLIPIDEMIA: ICD-10-CM

## 2025-01-16 DIAGNOSIS — I10 ESSENTIAL (PRIMARY) HYPERTENSION: Primary | ICD-10-CM

## 2025-01-16 DIAGNOSIS — I47.10 PSVT (PAROXYSMAL SUPRAVENTRICULAR TACHYCARDIA) (HCC): ICD-10-CM

## 2025-01-16 PROCEDURE — 99214 OFFICE O/P EST MOD 30 MIN: CPT | Performed by: INTERNAL MEDICINE

## 2025-01-16 PROCEDURE — 1160F RVW MEDS BY RX/DR IN RCRD: CPT | Performed by: INTERNAL MEDICINE

## 2025-01-16 PROCEDURE — 1159F MED LIST DOCD IN RCRD: CPT | Performed by: INTERNAL MEDICINE

## 2025-01-16 PROCEDURE — 1126F AMNT PAIN NOTED NONE PRSNT: CPT | Performed by: INTERNAL MEDICINE

## 2025-01-16 PROCEDURE — 1123F ACP DISCUSS/DSCN MKR DOCD: CPT | Performed by: INTERNAL MEDICINE

## 2025-01-16 PROCEDURE — 3078F DIAST BP <80 MM HG: CPT | Performed by: INTERNAL MEDICINE

## 2025-01-16 PROCEDURE — 3075F SYST BP GE 130 - 139MM HG: CPT | Performed by: INTERNAL MEDICINE

## 2025-01-16 NOTE — PROGRESS NOTES
Chief Complaint   Patient presents with    Hypertension    Coronary Artery Disease         Chest Pain  No        Last Lipids No results found for: \"CHOL\", \"TRIG\", \"HDL\", \"VLDL\", \"CHOLHDLRATIO\"     Refills No    /72 (Site: Left Upper Arm, Position: Sitting)   Pulse 76   Resp 18   Ht 1.651 m (5' 5\")   Wt 86.8 kg (191 lb 6.4 oz)   SpO2 94%   BMI 31.85 kg/m²       Have you been to the ER, urgent care clinic since your last visit? No  Hospitalized since your last visit? NO    2. Have you seen or consulted with any other health care providers outside of the [facility] since your last visit?  No

## 2025-01-16 NOTE — PROGRESS NOTES
Johann Ceron MD., [facility]      [address],[facility],[place] [address]      Office [phone],Fax [phone]                 Mady Rosales is here for a f/u office visit.      Primary care physician:   Abby Medina DO      CC - as documented in EMR      Dear Abby Patterson DO      I had the pleasure of seeing Ms.Rebecca NEGRA Rosales in the office today.             Assessment:        Nonobstructive CAD by coronary CTA 5/25/2022-asymptomatic   History of SVT-s/p ablation 2013   Hypertension   Hyperlipidemia   Diabetes mellitus-                  Plan:            Echo-3/2022-EF normal   Blood pressure controlled.   Lipids controlled per patient. On Crestor  HbA1c 6.9  Jan 2025.  Lipid Panel 9/17/24  Total 111, TG 76, HDL 44, LDL 51   Follow-up 12 months/earlier as needed.   Aggressive cardiovascular risk for modification.      Patient understands the plan. All questions were answered to the patient's satisfaction.      I appreciate the opportunity to be involved in  care. See note below for details. Please do not hesitate to contact us with questions  or concerns.      Johann Ceron MD             Cardiac Testing/ Procedures:           A.Cardiac Cath/PCI:       B.ECHO/ZACH: 3/2022         Left Ventricle: Left ventricle size is normal. Normal wall thickness (upper normal). Sigmoid septum. Normal wall motion. Normal left ventricular  systolic function. EF by 2D Simpsons Biplane is 69%. Diastolic function present with increased LAP with normal LV EF.     Left Atrium: Left atrial volume index is mildly increased (35-41 mL/m2).     Mitral Valve: Mild transvalvular regurgitation.     Tricuspid Valve: Mild transvalvular regurgitation. RVSP is 33 mmHg.     Pulmonic Valve: Mild transvalvular regurgitation           C.StressNuclear/Stress ECHO/Stress test:       D.Vascular:       E. EP: 2013 - SVT ablation    event monitor 5/17/2022-6/17/22-no

## 2025-02-05 RX ORDER — AMLODIPINE BESYLATE 5 MG/1
5 TABLET ORAL DAILY
Qty: 90 TABLET | Refills: 3 | Status: SHIPPED | OUTPATIENT
Start: 2025-02-05

## 2025-03-26 DIAGNOSIS — I10 ESSENTIAL (PRIMARY) HYPERTENSION: ICD-10-CM

## 2025-03-26 DIAGNOSIS — I47.10 SUPRAVENTRICULAR TACHYCARDIA: ICD-10-CM

## 2025-03-26 DIAGNOSIS — I47.10 PSVT (PAROXYSMAL SUPRAVENTRICULAR TACHYCARDIA): ICD-10-CM

## 2025-03-26 RX ORDER — CARVEDILOL 25 MG/1
25 TABLET ORAL 2 TIMES DAILY
Qty: 180 TABLET | Refills: 3 | Status: SHIPPED | OUTPATIENT
Start: 2025-03-26

## 2025-08-28 ENCOUNTER — CLINICAL DOCUMENTATION (OUTPATIENT)
Age: 80
End: 2025-08-28

## (undated) DEVICE — SOLIDIFIER MEDC 1200ML -- CONVERT TO 356117

## (undated) DEVICE — CUFF RMFG BP INF SZ 11 DISP -- LAWSON OEM ITEM 238915

## (undated) DEVICE — BAG BELONG PT PERS CLEAR HANDL

## (undated) DEVICE — Device

## (undated) DEVICE — KIT COLON W/ 1.1OZ LUB AND 2 END

## (undated) DEVICE — 1200 GUARD II KIT W/5MM TUBE W/O VAC TUBE: Brand: GUARDIAN

## (undated) DEVICE — ADULT SPO2 SENSOR: Brand: NELLCOR

## (undated) DEVICE — CATH IV AUTOGRD BC BLU 22GA 25 -- INSYTE

## (undated) DEVICE — KENDALL RADIOLUCENT FOAM MONITORING ELECTRODE -RECTANGULAR SHAPE: Brand: KENDALL